# Patient Record
Sex: FEMALE | Race: WHITE | HISPANIC OR LATINO | Employment: UNEMPLOYED | URBAN - METROPOLITAN AREA
[De-identification: names, ages, dates, MRNs, and addresses within clinical notes are randomized per-mention and may not be internally consistent; named-entity substitution may affect disease eponyms.]

---

## 2016-05-03 LAB — EXTERNAL HIV SCREEN: NORMAL

## 2018-03-15 LAB — HCV AB SER-ACNC: NEGATIVE

## 2019-05-29 ENCOUNTER — OFFICE VISIT (OUTPATIENT)
Dept: OBGYN CLINIC | Facility: CLINIC | Age: 59
End: 2019-05-29
Payer: COMMERCIAL

## 2019-05-29 VITALS
WEIGHT: 149.2 LBS | HEIGHT: 63 IN | DIASTOLIC BLOOD PRESSURE: 90 MMHG | SYSTOLIC BLOOD PRESSURE: 140 MMHG | OXYGEN SATURATION: 98 % | BODY MASS INDEX: 26.44 KG/M2 | HEART RATE: 100 BPM

## 2019-05-29 DIAGNOSIS — R10.2 COMBINED ABDOMINAL AND PELVIC PAIN: Primary | ICD-10-CM

## 2019-05-29 DIAGNOSIS — R10.9 COMBINED ABDOMINAL AND PELVIC PAIN: Primary | ICD-10-CM

## 2019-05-29 PROCEDURE — 99204 OFFICE O/P NEW MOD 45 MIN: CPT | Performed by: NURSE PRACTITIONER

## 2019-05-29 RX ORDER — CYCLOSPORINE 0.5 MG/ML
EMULSION OPHTHALMIC EVERY 12 HOURS
COMMUNITY
Start: 2019-02-10 | End: 2021-04-23

## 2019-05-30 ENCOUNTER — TELEPHONE (OUTPATIENT)
Dept: GASTROENTEROLOGY | Facility: AMBULARY SURGERY CENTER | Age: 59
End: 2019-05-30

## 2019-05-31 ENCOUNTER — OFFICE VISIT (OUTPATIENT)
Dept: UROLOGY | Facility: CLINIC | Age: 59
End: 2019-05-31
Payer: COMMERCIAL

## 2019-05-31 VITALS
HEART RATE: 76 BPM | WEIGHT: 151 LBS | BODY MASS INDEX: 26.75 KG/M2 | DIASTOLIC BLOOD PRESSURE: 70 MMHG | HEIGHT: 63 IN | SYSTOLIC BLOOD PRESSURE: 124 MMHG

## 2019-05-31 DIAGNOSIS — Z12.11 SCREENING FOR COLON CANCER: ICD-10-CM

## 2019-05-31 DIAGNOSIS — R10.2 PELVIC PAIN: Primary | ICD-10-CM

## 2019-05-31 LAB
SL AMB  POCT GLUCOSE, UA: ABNORMAL
SL AMB LEUKOCYTE ESTERASE,UA: ABNORMAL
SL AMB POCT BILIRUBIN,UA: ABNORMAL
SL AMB POCT BLOOD,UA: ABNORMAL
SL AMB POCT CLARITY,UA: CLEAR
SL AMB POCT COLOR,UA: YELLOW
SL AMB POCT KETONES,UA: ABNORMAL
SL AMB POCT NITRITE,UA: ABNORMAL
SL AMB POCT PH,UA: 5
SL AMB POCT SPECIFIC GRAVITY,UA: 1.02
SL AMB POCT URINE PROTEIN: ABNORMAL
SL AMB POCT UROBILINOGEN: ABNORMAL

## 2019-05-31 PROCEDURE — 99243 OFF/OP CNSLTJ NEW/EST LOW 30: CPT | Performed by: UROLOGY

## 2019-05-31 PROCEDURE — 81002 URINALYSIS NONAUTO W/O SCOPE: CPT | Performed by: UROLOGY

## 2019-06-06 ENCOUNTER — CONSULT (OUTPATIENT)
Dept: GASTROENTEROLOGY | Facility: MEDICAL CENTER | Age: 59
End: 2019-06-06
Payer: COMMERCIAL

## 2019-06-06 VITALS
HEIGHT: 63 IN | TEMPERATURE: 98.5 F | HEART RATE: 67 BPM | SYSTOLIC BLOOD PRESSURE: 126 MMHG | DIASTOLIC BLOOD PRESSURE: 74 MMHG | BODY MASS INDEX: 26.51 KG/M2 | WEIGHT: 149.6 LBS

## 2019-06-06 DIAGNOSIS — R10.9 ABDOMINAL PAIN, UNSPECIFIED ABDOMINAL LOCATION: Primary | ICD-10-CM

## 2019-06-06 DIAGNOSIS — Z12.11 SCREENING FOR COLON CANCER: ICD-10-CM

## 2019-06-06 DIAGNOSIS — R12 HEARTBURN: ICD-10-CM

## 2019-06-06 DIAGNOSIS — R13.10 DYSPHAGIA, UNSPECIFIED TYPE: ICD-10-CM

## 2019-06-06 PROCEDURE — 99244 OFF/OP CNSLTJ NEW/EST MOD 40: CPT | Performed by: INTERNAL MEDICINE

## 2019-06-10 ENCOUNTER — ANESTHESIA EVENT (OUTPATIENT)
Dept: GASTROENTEROLOGY | Facility: MEDICAL CENTER | Age: 59
End: 2019-06-10

## 2019-06-13 ENCOUNTER — OFFICE VISIT (OUTPATIENT)
Dept: UROLOGY | Facility: MEDICAL CENTER | Age: 59
End: 2019-06-13
Payer: COMMERCIAL

## 2019-06-13 VITALS
WEIGHT: 151 LBS | DIASTOLIC BLOOD PRESSURE: 70 MMHG | SYSTOLIC BLOOD PRESSURE: 120 MMHG | HEART RATE: 68 BPM | HEIGHT: 63 IN | BODY MASS INDEX: 26.75 KG/M2

## 2019-06-13 DIAGNOSIS — R35.0 URINARY FREQUENCY: ICD-10-CM

## 2019-06-13 DIAGNOSIS — N39.41 URGE INCONTINENCE: ICD-10-CM

## 2019-06-13 DIAGNOSIS — G54.1 GENITOFEMORAL NEUROPATHY: ICD-10-CM

## 2019-06-13 DIAGNOSIS — N95.2 ATROPHIC VAGINITIS: ICD-10-CM

## 2019-06-13 DIAGNOSIS — R10.2 PELVIC PAIN: Primary | ICD-10-CM

## 2019-06-13 DIAGNOSIS — M62.89 PELVIC FLOOR DYSFUNCTION: ICD-10-CM

## 2019-06-13 LAB
POST-VOID RESIDUAL VOLUME, ML POC: 6 ML
SL AMB  POCT GLUCOSE, UA: ABNORMAL
SL AMB LEUKOCYTE ESTERASE,UA: ABNORMAL
SL AMB POCT BILIRUBIN,UA: ABNORMAL
SL AMB POCT BLOOD,UA: ABNORMAL
SL AMB POCT CLARITY,UA: CLEAR
SL AMB POCT COLOR,UA: YELLOW
SL AMB POCT KETONES,UA: ABNORMAL
SL AMB POCT NITRITE,UA: ABNORMAL
SL AMB POCT PH,UA: 5.5
SL AMB POCT SPECIFIC GRAVITY,UA: 1
SL AMB POCT URINE PROTEIN: ABNORMAL
SL AMB POCT UROBILINOGEN: 0.2

## 2019-06-13 PROCEDURE — 51798 US URINE CAPACITY MEASURE: CPT | Performed by: UROLOGY

## 2019-06-13 PROCEDURE — 99214 OFFICE O/P EST MOD 30 MIN: CPT | Performed by: UROLOGY

## 2019-06-13 PROCEDURE — 81003 URINALYSIS AUTO W/O SCOPE: CPT | Performed by: UROLOGY

## 2019-06-13 RX ORDER — GABAPENTIN 100 MG/1
100 CAPSULE ORAL 3 TIMES DAILY
Qty: 90 CAPSULE | Refills: 11 | Status: SHIPPED | OUTPATIENT
Start: 2019-06-13 | End: 2019-06-20 | Stop reason: ALTCHOICE

## 2019-06-20 ENCOUNTER — OFFICE VISIT (OUTPATIENT)
Dept: FAMILY MEDICINE CLINIC | Facility: CLINIC | Age: 59
End: 2019-06-20
Payer: COMMERCIAL

## 2019-06-20 VITALS
BODY MASS INDEX: 26.9 KG/M2 | TEMPERATURE: 98.9 F | HEART RATE: 73 BPM | DIASTOLIC BLOOD PRESSURE: 70 MMHG | SYSTOLIC BLOOD PRESSURE: 120 MMHG | WEIGHT: 151.8 LBS | OXYGEN SATURATION: 97 % | HEIGHT: 63 IN

## 2019-06-20 DIAGNOSIS — M79.10 MYALGIA: Primary | Chronic | ICD-10-CM

## 2019-06-20 PROBLEM — R10.30 LOWER ABDOMINAL PAIN: Chronic | Status: ACTIVE | Noted: 2019-06-20

## 2019-06-20 PROCEDURE — 99204 OFFICE O/P NEW MOD 45 MIN: CPT | Performed by: FAMILY MEDICINE

## 2019-06-20 PROCEDURE — 1036F TOBACCO NON-USER: CPT | Performed by: FAMILY MEDICINE

## 2019-06-21 ENCOUNTER — HOSPITAL ENCOUNTER (OUTPATIENT)
Dept: ULTRASOUND IMAGING | Facility: HOSPITAL | Age: 59
Discharge: HOME | End: 2019-06-21
Attending: NURSE PRACTITIONER
Payer: COMMERCIAL

## 2019-06-21 DIAGNOSIS — R10.9 COMBINED ABDOMINAL AND PELVIC PAIN: ICD-10-CM

## 2019-06-21 DIAGNOSIS — R10.2 COMBINED ABDOMINAL AND PELVIC PAIN: ICD-10-CM

## 2019-06-21 PROCEDURE — 76856 US EXAM PELVIC COMPLETE: CPT

## 2019-06-21 PROCEDURE — 76830 TRANSVAGINAL US NON-OB: CPT

## 2019-06-28 LAB
BUN SERPL-MCNC: 14 MG/DL (ref 7–25)
BUN/CREAT SERPL: ABNORMAL (CALC) (ref 6–22)
CALCIUM SERPL-MCNC: 9.6 MG/DL (ref 8.6–10.4)
CHLORIDE SERPL-SCNC: 105 MMOL/L (ref 98–110)
CO2 SERPL-SCNC: 29 MMOL/L (ref 20–32)
CREAT SERPL-MCNC: 0.64 MG/DL (ref 0.5–0.99)
CRP SERPL-MCNC: 2.9 MG/L
ERYTHROCYTE [SEDIMENTATION RATE] IN BLOOD BY WESTERGREN METHOD: 9 MM/H
GLUCOSE SERPL-MCNC: 101 MG/DL (ref 65–99)
POTASSIUM SERPL-SCNC: 4.9 MMOL/L (ref 3.5–5.3)
SL AMB EGFR AFRICAN AMERICAN: 112 ML/MIN/1.73M2
SL AMB EGFR NON AFRICAN AMERICAN: 97 ML/MIN/1.73M2
SODIUM SERPL-SCNC: 140 MMOL/L (ref 135–146)

## 2019-07-02 ENCOUNTER — HOSPITAL ENCOUNTER (OUTPATIENT)
Dept: MRI IMAGING | Facility: HOSPITAL | Age: 59
Discharge: HOME | End: 2019-07-02
Attending: UROLOGY
Payer: COMMERCIAL

## 2019-07-02 DIAGNOSIS — R10.2 PELVIC PAIN: ICD-10-CM

## 2019-07-02 DIAGNOSIS — M62.89 PELVIC FLOOR DYSFUNCTION: ICD-10-CM

## 2019-07-02 PROCEDURE — 72197 MRI PELVIS W/O & W/DYE: CPT

## 2019-07-02 PROCEDURE — A9585 GADOBUTROL INJECTION: HCPCS | Performed by: UROLOGY

## 2019-07-02 RX ADMIN — GADOBUTROL 6 ML: 604.72 INJECTION INTRAVENOUS at 16:15

## 2019-07-08 ENCOUNTER — TELEPHONE (OUTPATIENT)
Dept: UROLOGY | Facility: AMBULATORY SURGERY CENTER | Age: 59
End: 2019-07-08

## 2019-07-08 NOTE — TELEPHONE ENCOUNTER
Okay to notify patient that there was no masses or cystocele noted on the MRI  There is, however, something the radiologist is calling posterior compartment relaxation resolving the rectum  Plan of care will be discussed at her appointment with Dr James Reese

## 2019-07-08 NOTE — TELEPHONE ENCOUNTER
Patient of Dr Trevino Agent calling to inquire about test results for MRI  She has upcoming appointment on 7/16/19 but rather not wait for results  Please advise

## 2019-07-16 ENCOUNTER — OFFICE VISIT (OUTPATIENT)
Dept: UROLOGY | Facility: MEDICAL CENTER | Age: 59
End: 2019-07-16
Payer: COMMERCIAL

## 2019-07-16 VITALS
BODY MASS INDEX: 26.58 KG/M2 | WEIGHT: 150 LBS | SYSTOLIC BLOOD PRESSURE: 110 MMHG | HEIGHT: 63 IN | HEART RATE: 61 BPM | DIASTOLIC BLOOD PRESSURE: 80 MMHG

## 2019-07-16 DIAGNOSIS — F41.8 ANTICIPATORY ANXIETY: Primary | ICD-10-CM

## 2019-07-16 DIAGNOSIS — N39.41 URGE INCONTINENCE: ICD-10-CM

## 2019-07-16 PROCEDURE — 99213 OFFICE O/P EST LOW 20 MIN: CPT | Performed by: UROLOGY

## 2019-07-16 RX ORDER — DIAZEPAM 10 MG/1
10 TABLET ORAL ONCE
Qty: 1 TABLET | Refills: 0 | Status: SHIPPED | OUTPATIENT
Start: 2019-07-16 | End: 2019-08-27 | Stop reason: ALTCHOICE

## 2019-07-16 NOTE — PROGRESS NOTES
[address] [facility]  [facility]  [address] [facility]  [place], [address] [place] [address]  [phone]  www  [email or web address]  [email or web address]      NAME: Eduarda Owen  AGE: 59 y o  SEX: female  : 1960   MRN: [id number]    DATE: 2019  TIME: 12:05 PM    Assessment and Plan:    Lower abdominal pain and pelvic pain, with trigger point in the right lateral aspect of her Pfannenstiel incision  She also has urge incontinence and probably some bladder spasms  I encouraged her to use the Myrbetriq  She is being worked up for fibromyalgia by Dr Gan Lax   She does not wish to have a bupivacaine injection at this time  MRI is negative  There is nothing life-threatening occurring so all treatments are based upon her symptoms  Follow-up with me as needed  Chief Complaint   No chief complaint on file  History of Present Illness   Here for follow-up of pelvic pain with a trigger 0 1 cm above right side of her Pfannenstiel incision, and left incisional pain  She also has left posterior pelvic vaginal wall tenderness that triggers the symptoms  Very tight pelvic floor muscles  She was referred to physical therapy last office visit, and started on Myrbetriq for urge incontinence  A pelvic MRI was ordered and this showed a rectocele and sigmoid diverticulosis but otherwise was normal   Last office visit we had considered giving trigger point injections with bupivacaine  She is very fearful of receiving an injection  When she took the 1 tablet Myrbetriq she said it actually helped with her pain  Therefore encouraged her to use the Myrbetriq because she will not have any central side effects and she is concerned about affecting her liver, but I reassured her that I give this to many people with liver dysfunction without any problem  She also drinks a lot of water so she goes frequently        The following portions of the patient's history were reviewed and updated as appropriate: allergies, current medications, past family history, past medical history, past social history, past surgical history and problem list   Past Medical History:   Diagnosis Date    Abnormal Pap smear of cervix     Heart palpitations     has port to monitor; none since 2009    Hyperlipemia     MVP (mitral valve prolapse) 1985     Past Surgical History:   Procedure Laterality Date    CARDIAC CATHETERIZATION  2009    normal    CARDIAC LOOP [address]    with LSO; ? 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 -- patient unclear with history     shoulder  Review of Systems   Review of Systems    Active Problem List     Patient Active Problem List   Diagnosis    Myalgia    Lower abdominal pain       Objective   There were no vitals taken for this visit      Physical Exam        Current Medications     Current Outpatient Medications:     cycloSPORINE (RESTASIS MULTIDOSE) 0 05 % ophthalmic emulsion, , Disp: , Rfl:     Na Sulfate-K Sulfate-Mg Sulf (SUPREP BOWEL PREP KIT) 17 5-3 13-1 6 GM/177ML SOLN, Take 1 Dose by mouth once for 1 dose, Disp: 2 Bottle, Rfl: 0    Travoprost (TRAVATAN Z OP), Apply to eye, Disp: , Rfl:         Timothy Simon MD

## 2019-07-26 ENCOUNTER — HOSPITAL ENCOUNTER (OUTPATIENT)
Dept: GASTROENTEROLOGY | Facility: MEDICAL CENTER | Age: 59
Setting detail: OUTPATIENT SURGERY
Discharge: HOME | End: 2019-07-26
Attending: INTERNAL MEDICINE | Admitting: INTERNAL MEDICINE
Payer: COMMERCIAL

## 2019-07-26 ENCOUNTER — ANESTHESIA (OUTPATIENT)
Dept: GASTROENTEROLOGY | Facility: MEDICAL CENTER | Age: 59
End: 2019-07-26

## 2019-07-26 VITALS
HEART RATE: 55 BPM | BODY MASS INDEX: 26.58 KG/M2 | OXYGEN SATURATION: 99 % | TEMPERATURE: 98.3 F | HEIGHT: 63 IN | DIASTOLIC BLOOD PRESSURE: 64 MMHG | SYSTOLIC BLOOD PRESSURE: 104 MMHG | RESPIRATION RATE: 16 BRPM | WEIGHT: 150 LBS

## 2019-07-26 DIAGNOSIS — R10.9 ABDOMINAL PAIN, UNSPECIFIED ABDOMINAL LOCATION: ICD-10-CM

## 2019-07-26 PROCEDURE — 43239 EGD BIOPSY SINGLE/MULTIPLE: CPT | Performed by: INTERNAL MEDICINE

## 2019-07-26 PROCEDURE — 45380 COLONOSCOPY AND BIOPSY: CPT | Performed by: INTERNAL MEDICINE

## 2019-07-26 PROCEDURE — 88305 TISSUE EXAM BY PATHOLOGIST: CPT | Performed by: PATHOLOGY

## 2019-07-26 RX ORDER — SODIUM CHLORIDE 9 MG/ML
125 INJECTION, SOLUTION INTRAVENOUS CONTINUOUS
Status: DISCONTINUED | OUTPATIENT
Start: 2019-07-26 | End: 2019-07-30 | Stop reason: HOSPADM

## 2019-07-26 RX ORDER — PROPOFOL 10 MG/ML
INJECTION, EMULSION INTRAVENOUS AS NEEDED
Status: DISCONTINUED | OUTPATIENT
Start: 2019-07-26 | End: 2019-07-26 | Stop reason: SURG

## 2019-07-26 RX ADMIN — PROPOFOL 200 MG: 10 INJECTION, EMULSION INTRAVENOUS at 11:26

## 2019-07-26 RX ADMIN — Medication 40 MG: at 11:39

## 2019-07-26 RX ADMIN — PROPOFOL 100 MG: 10 INJECTION, EMULSION INTRAVENOUS at 11:28

## 2019-07-26 RX ADMIN — PROPOFOL 100 MG: 10 INJECTION, EMULSION INTRAVENOUS at 11:38

## 2019-07-26 RX ADMIN — PROPOFOL 100 MG: 10 INJECTION, EMULSION INTRAVENOUS at 11:42

## 2019-07-26 RX ADMIN — SODIUM CHLORIDE 125 ML/HR: 0.9 INJECTION, SOLUTION INTRAVENOUS at 10:50

## 2019-07-26 NOTE — H&P
History and Physical -  [facility]  Stephen Corral 59 y o  female MRN: [id number]                  HPI: Stephen Corral is a 59y o  year old female who presents for heartburn, abdominal pain  REVIEW OF SYSTEMS: Per the HPI, and otherwise unremarkable      Historical Information   Past Medical History:   Diagnosis Date    Abnormal Pap smear of cervix     Colon polyp     Glaucoma     Heart palpitations     has port to monitor; none since 2009    Hyperlipemia     MVP (mitral valve prolapse) 1985     Past Surgical History:   Procedure Laterality Date    BREAST IMPLANT Bilateral     CARDIAC CATHETERIZATION  2009    normal    CARDIAC LOOP RECORDER      CHOLECYSTECTOMY  1995    COLONOSCOPY      HERNIA REPAIR      umbilical    KNEE DISLOCATION SURGERY Right     TOTAL ABDOMINAL HYSTERECTOMY  1999    with LSO; ? 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 -- patient unclear with history    TUBAL LIGATION      WRIST SURGERY Left     states there are screws but not metal   Can go through a metal detector     Social History   Social History     Substance and Sexual Activity   Alcohol Use Never    Frequency: Never     Social History     Substance and Sexual Activity   Drug Use Never     Social History     Tobacco Use   Smoking Status Never Smoker   Smokeless Tobacco Never Used     Family History   Problem Relation Age of Onset    Hypertension Mother     Breast cancer Mother     Osteoporosis Mother     Heart attack Father     Diabetes Sister     Diabetes Brother     Heart attack Paternal Grandfather        Meds/Allergies       (Not in a hospital admission)    Allergies   Allergen Reactions    Brimonidine Tartrate     Iodine Hives and Swelling     12/15/10      Nuts Itching     Any kind of nuts    Shellfish Allergy     Shellfish-Derived Products Hives and Swelling    Brimonidine Itching and Palpitations       Objective     Blood pressure 127/68, pulse 62, temperature 98 3 °F (36 8 °C), temperature source Temporal, resp  rate 16, height 5' 3" (1 6 m), weight 68 kg (150 lb), SpO2 99 %  PHYSICAL EXAM    Gen: NAD  CV: RRR  CHEST: Clear  ABD: soft, NT/ND  EXT: no edema      ASSESSMENT/PLAN:  This is a 59y o  year old female here for upper endoscopy and colonoscopy, and she is stable and optimized for her procedure

## 2019-07-26 NOTE — DISCHARGE INSTRUCTIONS
Upper Endoscopy   WHAT YOU NEED TO KNOW:   An upper endoscopy is also called an upper gastrointestinal (GI) endoscopy, or an esophagogastroduodenoscopy (EGD)  You may feel bloated, gassy, or have some abdominal discomfort after your procedure  Your throat may be sore for 24 to 36 hours  You may burp or pass gas from air that is still inside your body  DISCHARGE INSTRUCTIONS:   Call 911 if:   · You have sudden chest pain or trouble breathing  Seek care immediately if:   · You feel dizzy or faint  · You have trouble swallowing  · You have severe throat pain  · Your bowel movements are very dark or black  · Your abdomen is hard and firm and you have severe pain  · You vomit blood  Contact your healthcare provider if:   · You feel full or bloated and cannot burp or pass gas  · You have not had a bowel movement for 3 days after your procedure  · You have neck pain  · You have a fever or chills  · You have nausea or are vomiting  · You have a rash or hives  · You have questions or concerns about your endoscopy  Relieve a sore throat:  Suck on throat lozenges or crushed ice  Gargle with a small amount of warm salt water  Mix 1 teaspoon of salt and 1 cup of warm water to make salt water  Relieve gas and discomfort from bloating:  Lie on your right side with a heating pad on your abdomen  Take short walks to help pass gas  Eat small meals until bloating is relieved  Rest after your procedure:  Do not drive or make important decisions until the day after your procedure  Return to your normal activity as directed  You can usually return to work the day after your procedure  Follow up with your healthcare provider as directed:  Write down your questions so you remember to ask them during your visits  © 2017 [employer] Information is for End User's use only and may not be sold, redistributed or otherwise used for commercial purposes   All illustrations and images included in CareNotes® are the copyrighted property of A D A M , Inc  or Alejandro Lui  The above information is an  only  It is not intended as medical advice for individual conditions or treatments  Talk to your doctor, nurse or pharmacist before following any medical regimen to see if it is safe and effective for you  Diverticulosis   WHAT YOU NEED TO KNOW:   Diverticulosis is a condition that causes small pockets called diverticula to form in your intestine  These pockets make it difficult for bowel movements to pass through your digestive system  DISCHARGE INSTRUCTIONS:   Return to the emergency department if:   · You have severe pain on the left side of your lower abdomen  · Your bowel movements are bright or dark red  Contact your healthcare provider if:   · You have a fever and chills  · You feel dizzy or lightheaded  · You have nausea, or you are vomiting  · You have a change in your bowel movements  · You have questions or concerns about your condition or care  Medicines:   · Medicines  to soften your bowel movements may be given  You may also need medicines to treat symptoms such as bloating and pain  · Take your medicine as directed  Contact your healthcare provider if you think your medicine is not helping or if you have side effects  Tell him or her if you are allergic to any medicine  Keep a list of the medicines, vitamins, and herbs you take  Include the amounts, and when and why you take them  Bring the list or the pill bottles to follow-up visits  Carry your medicine list with you in case of an emergency  Self-care: The goal of treatment is to manage any symptoms you have and prevent other problems such as diverticulitis  Diverticulitis is swelling or infection of the diverticula  Your healthcare provider may recommend any of the following:  · Eat a variety of high-fiber foods    High-fiber foods help you have regular bowel movements  High-fiber foods include cooked beans, fruits, vegetables, and some cereals  Most adults need 25 to 35 grams of fiber each day  Your healthcare provider may recommend that you have more  Ask your healthcare provider how much fiber you need  Increase fiber slowly  You may have abdominal discomfort, bloating, and gas if you add fiber to your diet too quickly  You may need to take a fiber supplement if you are not getting enough fiber from food  · Drink liquids as directed  You may need to drink 2 to 3 liters (8 to 12 cups) of liquids every day  Ask your healthcare provider how much liquid to drink each day and which liquids are best for you  · Apply heat  on your abdomen for 20 to 30 minutes every 2 hours for as many days as directed  Heat helps decrease pain and muscle spasms  Help prevent diverticulitis or other symptoms: The following may help decrease your risk for diverticulitis or symptoms, such as bleeding  Talk to your provider about these or other things you can do to prevent problems that may occur with diverticulosis  · Exercise regularly  Ask your healthcare provider about the best exercise plan for you  Exercise can help you have regular bowel movements  Get 30 minutes of exercise on most days of the week  · Maintain a healthy weight  Ask your healthcare provider how much you should weigh  Ask him or her to help you create a weight loss plan if you are overweight  · Do not smoke  Nicotine and other chemicals in cigarettes increase your risk for diverticulitis  Ask your healthcare provider for information if you currently smoke and need help to quit  E-cigarettes or smokeless tobacco still contain nicotine  Talk to your healthcare provider before you use these products  · Ask your healthcare provider if it is safe to take NSAIDs  NSAIDs may increase your risk of diverticulitis    Follow up with your healthcare provider as directed:  Write down your questions so you remember to ask them during your visits  © 2017 [employer] Information is for End User's use only and may not be sold, redistributed or otherwise used for commercial purposes  All illustrations and images included in CareNotes® are the copyrighted property of A D A M , Inc  or Alejandro Lui  The above information is an  only  It is not intended as medical advice for individual conditions or treatments  Talk to your doctor, nurse or pharmacist before following any medical regimen to see if it is safe and effective for you  Diverticulosis Diet   WHAT YOU NEED TO KNOW:   What is a diverticulosis diet? A diverticulosis diet includes high-fiber foods  High-fiber foods help you have regular bowel movements  Extra fiber may decrease your risk of forming new diverticula (small pockets) in your intestine  A high-fiber diet may also help prevent diverticulitis  Diverticulitis is a painful condition that occurs when diverticula become inflamed or infected  You do not need to avoid nuts, seeds, corn, or popcorn while you are on a diverticulosis diet  How much fiber do I need? You may need 25 to 35 grams of fiber each day  Ask your dietitian or healthcare provider how much fiber you should have  Increase your intake of fiber slowly  When you eat more fiber, you may have gas and feel bloated  You may need to take a fiber supplement if you do not get enough fiber from food  Drink plenty of liquids as you increase the fiber in your diet  Your dietitian or healthcare provider may recommend 8 eight-ounce cups or more each day  Ask which liquids are best for you  Which foods are high in fiber?    · Foods with at least 4 grams of fiber per serving:      ¨ ? to ½ cup of high-fiber cereal (check the nutrition label on the box)    ¨ ½ cup of blackberries or raspberries    ¨ 4 dried prunes    ¨ 1 cooked artichoke    ¨ ½ cup of cooked legumes, such as lentils, or red, kidney, and rangel beans    · Foods with 1 to 3 grams of fiber per serving:      ¨ 1 slice of whole-wheat, pumpernickel, or rye bread    ¨ 4 whole-wheat crackers    ¨ ½ cup of cereal with 1 to 3 grams of fiber per serving (check the nutrition label on the box)    ¨ 1 piece of fruit, such as an apple, banana, pear, kiwi, or orange    ¨ 3 dates    ¨ ½ cup of canned apricots, fruit cocktail, peaches, or pears    ¨ ½ cup of raw or cooked vegetables, such as carrots, cauliflower, cabbage, spinach, squash, or corn  When should I contact my healthcare provider? · You have questions about a high-fiber diet  · You have a change in your bowel movements  · You have an upset stomach  · You have a fever  · You have pain in your lower abdomen on the left side  · You have questions about your condition or care  CARE AGREEMENT:   You have the right to help plan your care  Learn about your health condition and how it may be treated  Discuss treatment options with your caregivers to decide what care you want to receive  You always have the right to refuse treatment  The above information is an  only  It is not intended as medical advice for individual conditions or treatments  Talk to your doctor, nurse or pharmacist before following any medical regimen to see if it is safe and effective for you  © 2017 [employer] Information is for End User's use only and may not be sold, redistributed or otherwise used for commercial purposes  All illustrations and images included in CareNotes® are the copyrighted property of A D A [employer]  or Alejandro Lui  Colonoscopy   WHAT YOU NEED TO KNOW:   A colonoscopy is a procedure to examine the inside of your colon (intestine) with a scope  Polyps or tissue growths may have been removed during your colonoscopy  It is normal to feel bloated and to have some abdominal discomfort  You should be passing gas   If you have hemorrhoids or you had polyps removed, you may have a small amount of bleeding  DISCHARGE INSTRUCTIONS:   Seek care immediately if:   · You have a large amount of bright red blood in your bowel movements  · Your abdomen is hard and firm and you have severe pain  · You have sudden trouble breathing  Contact your healthcare provider if:   · You develop a rash or hives  · You have a fever within 24 hours of your procedure       · You have not had a bowel movement for 3 days after your procedure  · You have questions or concerns about your condition or care  Activity:   · Do not lift, strain, or run  for 3 days after your procedure  · Rest after your procedure  You have been given medicine to relax you  Do not  drive or make important decisions until the day after your procedure  Return to your normal activity as directed  · Relieve gas and discomfort from bloating  by lying on your right side with a heating pad on your abdomen  You may need to take short walks to help the gas move out  Eat small meals until bloating is relieved  If you had polyps removed: For 7 days after your procedure:  · Do not  take aspirin  · Do not  go on long car rides  Follow up with your healthcare provider as directed:  Write down your questions so you remember to ask them during your visits  © 2017 [address] [employer] is for End User's use only and may not be sold, redistributed or otherwise used for commercial purposes  All illustrations and images included in CareNotes® are the copyrighted property of A D A [employer]  or Alejandro Lui  The above information is an  only  It is not intended as medical advice for individual conditions or treatments  Talk to your doctor, nurse or pharmacist before following any medical regimen to see if it is safe and effective for you

## 2019-07-26 NOTE — ANESTHESIA PREPROCEDURE EVALUATION
Review of Systems/Medical History  Patient summary reviewed  Chart reviewed      Cardiovascular  Exercise tolerance (METS): >4,  Hyperlipidemia,    Pulmonary  Negative pulmonary ROS        GI/Hepatic  Negative GI/hepatic ROS          Negative  ROS        Endo/Other  Negative endo/other ROS      GYN  Negative gynecology ROS          Hematology  Negative hematology ROS      Musculoskeletal  Negative musculoskeletal ROS        Neurology  Negative neurology ROS      Psychology   Negative psychology ROS              Physical Exam    Airway    Mallampati score: I  TM Distance: <3 FB  Neck ROM: full     Dental       Cardiovascular  Rhythm: regular,     Pulmonary  Breath sounds clear to auscultation,     Other Findings        Anesthesia Plan  ASA Score- 2     Anesthesia Type- IV sedation with anesthesia with ASA Monitors  Additional Monitors:   Airway Plan:         Plan Factors- Patient instructed to abstain from smoking on day of procedure  Patient did not smoke on day of surgery  Induction- intravenous  Postoperative Plan-     Informed Consent- Anesthetic plan and risks discussed with patient

## 2019-07-30 DIAGNOSIS — K27.9 H PYLORI ULCER: Primary | ICD-10-CM

## 2019-07-30 DIAGNOSIS — B96.81 H PYLORI ULCER: Primary | ICD-10-CM

## 2019-07-30 RX ORDER — CLARITHROMYCIN 500 MG/1
500 TABLET, COATED ORAL EVERY 12 HOURS SCHEDULED
Qty: 28 TABLET | Refills: 0 | Status: SHIPPED | OUTPATIENT
Start: 2019-07-30 | End: 2019-08-13

## 2019-07-30 RX ORDER — OMEPRAZOLE 40 MG/1
40 CAPSULE, DELAYED RELEASE ORAL
Qty: 28 CAPSULE | Refills: 0 | Status: SHIPPED | OUTPATIENT
Start: 2019-07-30 | End: 2019-08-27 | Stop reason: ALTCHOICE

## 2019-07-30 RX ORDER — AMOXICILLIN 500 MG/1
1000 TABLET, FILM COATED ORAL 2 TIMES DAILY
Qty: 56 TABLET | Refills: 0 | Status: SHIPPED | OUTPATIENT
Start: 2019-07-30 | End: 2019-08-13

## 2019-08-13 ENCOUNTER — OFFICE VISIT (OUTPATIENT)
Dept: FAMILY MEDICINE CLINIC | Facility: CLINIC | Age: 59
End: 2019-08-13
Payer: COMMERCIAL

## 2019-08-13 VITALS
BODY MASS INDEX: 26.44 KG/M2 | HEART RATE: 71 BPM | DIASTOLIC BLOOD PRESSURE: 70 MMHG | OXYGEN SATURATION: 98 % | SYSTOLIC BLOOD PRESSURE: 90 MMHG | WEIGHT: 149.2 LBS | HEIGHT: 63 IN | TEMPERATURE: 97.6 F

## 2019-08-13 DIAGNOSIS — A04.8 H. PYLORI INFECTION: Chronic | ICD-10-CM

## 2019-08-13 DIAGNOSIS — M79.10 MYALGIA: Chronic | ICD-10-CM

## 2019-08-13 DIAGNOSIS — Z12.31 ENCOUNTER FOR SCREENING MAMMOGRAM FOR MALIGNANT NEOPLASM OF BREAST: Primary | ICD-10-CM

## 2019-08-13 DIAGNOSIS — M81.0 AGE-RELATED OSTEOPOROSIS WITHOUT CURRENT PATHOLOGICAL FRACTURE: Chronic | ICD-10-CM

## 2019-08-13 PROCEDURE — 99214 OFFICE O/P EST MOD 30 MIN: CPT | Performed by: FAMILY MEDICINE

## 2019-08-13 RX ORDER — LATANOPROST 50 UG/ML
SOLUTION/ DROPS OPHTHALMIC
COMMUNITY
Start: 2019-07-22 | End: 2019-12-27 | Stop reason: ALTCHOICE

## 2019-08-13 NOTE — PATIENT INSTRUCTIONS
Use 2 tylenol arthritis for the hip or knee pains ; Return in 3 wks for review of xrays ; consider injection lateral L hip region (bursitits )     Begin 1 Caltrate D daily for 1 month  Then start taking it twice a day  To prevent constipation drink plenty of fluids take for dried fruits fresh veggies that have a lot of crunch to them  You may need to institute a daily stool softener if the calcium is too constipating      We will probably consider a referral for rheumatologic evaluation on the osteoporosis as anti resorptive medications orally would be a bad idea given her recent stomach issues

## 2019-08-13 NOTE — PROGRESS NOTES
Assessment/Plan:    No problem-specific Assessment & Plan notes found for this encounter  Diagnoses and all orders for this visit:    Encounter for screening mammogram for malignant neoplasm of breast    Myalgia    Other orders  -     Cancel: Mammo screening bilateral w 3d & cad; Future  -     latanoprost (XALATAN) 0 005 % ophthalmic solution          Subjective:      Patient ID: Jose Maria Joseph is a 59 y o  female  Patient here to follow up the above studies  She did some some research on fibromyalgia and does not feel that that actually applies  Most of her pain is in the left hip region and right knee  We do not have any fresh x-rays on those areas for the last 4 years  DEXA bone scan was reviewed and shows osteoporosis  She has recently diagnosed with H pylori gastritis and has begun triple therapy yesterday  Sed rate and CRP were unrevealing  Lidocaine patch to R knee helps           The following portions of the patient's history were reviewed and updated as appropriate: allergies, current medications, past family history, past medical history, past social history, past surgical history and problem list     Review of Systems   Musculoskeletal: Positive for arthralgias, gait problem and myalgias  Objective:  Vitals:    08/13/19 1533   BP: 90/70   BP Location: Left arm   Patient Position: Sitting   Cuff Size: Adult   Pulse: 71   Temp: 97 6 °F (36 4 °C)   TempSrc: Temporal   SpO2: 98%   Weight: 67 7 kg (149 lb 3 2 oz)   Height: 5' 3" (1 6 m)      Physical Exam   Musculoskeletal: Normal range of motion     Tender over lateral L thigh [de-identified]   ??   Bursitis

## 2019-08-14 ENCOUNTER — TELEPHONE (OUTPATIENT)
Dept: GASTROENTEROLOGY | Facility: AMBULARY SURGERY CENTER | Age: 59
End: 2019-08-14

## 2019-08-14 NOTE — TELEPHONE ENCOUNTER
Rashel      She has questions about the doses of amoxicillin and omeprazole--please advise      Call back #  [phone] until pm

## 2019-08-14 NOTE — TELEPHONE ENCOUNTER
I called her work number but she was not at her desk, I then called the main number listed and left a message for her to call back

## 2019-08-17 LAB — 25(OH)D3 SERPL-MCNC: 13 NG/ML (ref 30–100)

## 2019-08-21 ENCOUNTER — TREATMENT (OUTPATIENT)
Dept: FAMILY MEDICINE CLINIC | Facility: CLINIC | Age: 59
End: 2019-08-21

## 2019-08-21 ENCOUNTER — TELEPHONE (OUTPATIENT)
Dept: FAMILY MEDICINE CLINIC | Facility: CLINIC | Age: 59
End: 2019-08-21

## 2019-08-21 DIAGNOSIS — M81.0 AGE-RELATED OSTEOPOROSIS WITHOUT CURRENT PATHOLOGICAL FRACTURE: Primary | Chronic | ICD-10-CM

## 2019-08-21 RX ORDER — ERGOCALCIFEROL 1.25 MG/1
50000 CAPSULE ORAL WEEKLY
Qty: 12 CAPSULE | Refills: 0 | Status: SHIPPED | OUTPATIENT
Start: 2019-08-21 | End: 2019-11-21 | Stop reason: ALTCHOICE

## 2019-08-21 NOTE — TELEPHONE ENCOUNTER
Spoke with patient she is aware of the message below can you please send over the rx  For her vitamin d and please mail out a new lab rx along with her recent results   Thank you

## 2019-08-21 NOTE — TELEPHONE ENCOUNTER
Left message for patient to call back regarding blood work results   Per glp results are; Vitamin-D quite low   We need to correct this so her osteoporosis is not get worse   It is possible the vitamin-D level may also explain some of her symptoms but there is no way to know until we get her vitamin-D back to normal      I will send in 8 weeks of weekly replacement 55257 units weekly after that she should begin 2000 units daily we should then repeat the vitamin-D level after on the daily dose for 4-6 weeks

## 2019-08-27 ENCOUNTER — OFFICE VISIT (OUTPATIENT)
Dept: FAMILY MEDICINE CLINIC | Facility: CLINIC | Age: 59
End: 2019-08-27
Payer: COMMERCIAL

## 2019-08-27 VITALS
TEMPERATURE: 97 F | SYSTOLIC BLOOD PRESSURE: 100 MMHG | HEIGHT: 63 IN | OXYGEN SATURATION: 98 % | DIASTOLIC BLOOD PRESSURE: 70 MMHG | HEART RATE: 62 BPM | WEIGHT: 150.6 LBS | BODY MASS INDEX: 26.68 KG/M2

## 2019-08-27 DIAGNOSIS — M81.0 AGE-RELATED OSTEOPOROSIS WITHOUT CURRENT PATHOLOGICAL FRACTURE: Primary | Chronic | ICD-10-CM

## 2019-08-27 DIAGNOSIS — A04.8 H. PYLORI INFECTION: Chronic | ICD-10-CM

## 2019-08-27 PROCEDURE — 99402 PREV MED CNSL INDIV APPRX 30: CPT | Performed by: FAMILY MEDICINE

## 2019-08-27 NOTE — PATIENT INSTRUCTIONS
You should try to get 10,000 steps 3 or 4 days per week  Continue the high-dose vitamin-D  We will set up an appointment with the Rheumatology doctors to talk about osteoporosis treatment

## 2019-08-27 NOTE — PROGRESS NOTES
Assessment/Plan:    No problem-specific Assessment & Plan notes found for this encounter  Diagnoses and all orders for this visit:    Age-related osteoporosis without current pathological fracture          Subjective:      Patient ID: Daniel Lagunas is a 59 y o  female  F/U osteoporosis and h pylori ; The following portions of the patient's history were reviewed and updated as appropriate: allergies, current medications, past family history, past medical history, past social history, past surgical history and problem list     Review of Systems      Objective:  Vitals:    08/27/19 1608   BP: 100/70   BP Location: Left arm   Patient Position: Sitting   Cuff Size: Adult   Pulse: 62   Temp: (!) 97 °F (36 1 °C)   TempSrc: Temporal   SpO2: 98%   Weight: 68 3 kg (150 lb 9 6 oz)   Height: 5' 3" (1 6 m)      Physical Exam   Constitutional: She appears well-developed and well-nourished  Eyes: Conjunctivae are normal    Pulmonary/Chest: Effort normal    Skin: Skin is warm and dry  Psychiatric: Thought content normal          More than half of this 30   minute visit spent counseling and coordinating care  We will ck on "proof of cure " requirements for h pylori

## 2019-08-28 ENCOUNTER — TELEPHONE (OUTPATIENT)
Dept: FAMILY MEDICINE CLINIC | Facility: CLINIC | Age: 59
End: 2019-08-28

## 2019-08-28 ENCOUNTER — TREATMENT (OUTPATIENT)
Dept: FAMILY MEDICINE CLINIC | Facility: CLINIC | Age: 59
End: 2019-08-28

## 2019-08-28 DIAGNOSIS — A04.8 H. PYLORI INFECTION: Primary | Chronic | ICD-10-CM

## 2019-08-28 NOTE — TELEPHONE ENCOUNTER
----- Message from Manuel Smith MD sent at 8/27/2019  4:29 PM EDT -----  Is test of cure required for h pylori

## 2019-08-28 NOTE — TELEPHONE ENCOUNTER
Pt called back returning Anastasia's phone call  She states that she is unavailable from October 3rd thru October 28th  The test can be scheduled for the 1st or the 2nd if Dr Miguel like or when she comes back   she will be available on the 28th of October  Pt can be reached during the day at work at [phone] please advise thank you

## 2019-08-29 NOTE — TELEPHONE ENCOUNTER
Left message for patient to call back  Did try work number but patient was working at her [employer]  Was transferred but was sent to another employees voicemail  Okay to mail out script ? glp is approving her to have done upon her return  St is to do this test at [facility]

## 2019-09-24 ENCOUNTER — TELEPHONE (OUTPATIENT)
Dept: FAMILY MEDICINE CLINIC | Facility: CLINIC | Age: 59
End: 2019-09-24

## 2019-09-24 NOTE — TELEPHONE ENCOUNTER
Still having pelvic pain  Wants appointment this  thursday a m     Leaving the state for one month starting 10/3/19  100% booked for Thursday  Please advise [phone] before 4   Then after 4   Cell [phone]

## 2019-09-26 ENCOUNTER — OFFICE VISIT (OUTPATIENT)
Dept: FAMILY MEDICINE CLINIC | Facility: CLINIC | Age: 59
End: 2019-09-26
Payer: COMMERCIAL

## 2019-09-26 VITALS
SYSTOLIC BLOOD PRESSURE: 100 MMHG | TEMPERATURE: 98.4 F | HEART RATE: 68 BPM | DIASTOLIC BLOOD PRESSURE: 80 MMHG | HEIGHT: 63 IN | WEIGHT: 150.1 LBS | OXYGEN SATURATION: 99 % | BODY MASS INDEX: 26.59 KG/M2

## 2019-09-26 DIAGNOSIS — Z11.59 SCREENING FOR VIRAL DISEASE: Primary | ICD-10-CM

## 2019-09-26 DIAGNOSIS — M89.9 PUBIC BONE PAIN: Chronic | ICD-10-CM

## 2019-09-26 PROCEDURE — 3008F BODY MASS INDEX DOCD: CPT | Performed by: FAMILY MEDICINE

## 2019-09-26 PROCEDURE — 99213 OFFICE O/P EST LOW 20 MIN: CPT | Performed by: FAMILY MEDICINE

## 2019-09-26 NOTE — PROGRESS NOTES
Assessment/Plan:    No problem-specific Assessment & Plan notes found for this encounter  Diagnoses and all orders for this visit:    Screening for viral disease  -     Hepatitis C antibody; Future          Subjective:      Patient ID: Haile Bautista is a 59 y o  female  Lower abd/ pelvic pain ; no urine /gyn sx ; no bowels sx x; colon exam UTD / U/S of area OK ; burning discomfort in pelvic area X 1-2 days ; The following portions of the patient's history were reviewed and updated as appropriate: allergies, current medications, past family history, past medical history, past social history, past surgical history and problem list     Review of Systems      Objective:  Vitals:    09/26/19 0856   BP: 100/80   BP Location: Left arm   Patient Position: Sitting   Cuff Size: Adult   Pulse: 68   Temp: 98 4 °F (36 9 °C)   TempSrc: Temporal   SpO2: 99%   Weight: 68 1 kg (150 lb 1 6 oz)   Height: 5' 3" (1 6 m)      Physical Exam   Abdominal: Soft  Bowel sounds are normal  She exhibits no mass  There is tenderness  Tender (quite ) over symphysis pubis          Patient's major area of pain is over the symphysis pubis  I believe there is some inflammation thereof  We described various techniques that she could use when having intimacy  As well she could use Aleve prior to this and possibly even on very bad days

## 2019-11-08 ENCOUNTER — TELEPHONE (OUTPATIENT)
Dept: FAMILY MEDICINE CLINIC | Facility: CLINIC | Age: 59
End: 2019-11-08

## 2019-11-12 ENCOUNTER — APPOINTMENT (OUTPATIENT)
Dept: LAB | Facility: HOSPITAL | Age: 59
End: 2019-11-12
Payer: COMMERCIAL

## 2019-11-12 DIAGNOSIS — M81.0 AGE-RELATED OSTEOPOROSIS WITHOUT CURRENT PATHOLOGICAL FRACTURE: Chronic | ICD-10-CM

## 2019-11-12 LAB — 25(OH)D3 SERPL-MCNC: 53.6 NG/ML (ref 30–100)

## 2019-11-12 PROCEDURE — 82306 VITAMIN D 25 HYDROXY: CPT

## 2019-11-12 PROCEDURE — 36415 COLL VENOUS BLD VENIPUNCTURE: CPT

## 2019-11-21 ENCOUNTER — OFFICE VISIT (OUTPATIENT)
Dept: FAMILY MEDICINE CLINIC | Facility: CLINIC | Age: 59
End: 2019-11-21
Payer: COMMERCIAL

## 2019-11-21 VITALS
HEART RATE: 64 BPM | HEIGHT: 63 IN | OXYGEN SATURATION: 96 % | DIASTOLIC BLOOD PRESSURE: 60 MMHG | BODY MASS INDEX: 27.55 KG/M2 | SYSTOLIC BLOOD PRESSURE: 80 MMHG | TEMPERATURE: 98.5 F | WEIGHT: 155.5 LBS

## 2019-11-21 DIAGNOSIS — Z12.31 ENCOUNTER FOR SCREENING MAMMOGRAM FOR MALIGNANT NEOPLASM OF BREAST: ICD-10-CM

## 2019-11-21 DIAGNOSIS — Z23 IMMUNIZATION DUE: Primary | ICD-10-CM

## 2019-11-21 DIAGNOSIS — A04.8 H. PYLORI INFECTION: Chronic | ICD-10-CM

## 2019-11-21 DIAGNOSIS — M81.0 AGE-RELATED OSTEOPOROSIS WITHOUT CURRENT PATHOLOGICAL FRACTURE: Chronic | ICD-10-CM

## 2019-11-21 PROCEDURE — 99401 PREV MED CNSL INDIV APPRX 15: CPT | Performed by: FAMILY MEDICINE

## 2019-11-21 NOTE — PROGRESS NOTES
Assessment/Plan:    No problem-specific Assessment & Plan notes found for this encounter  Diagnoses and all orders for this visit:    Immunization due    Encounter for screening mammogram for malignant neoplasm of breast  -     Mammo screening bilateral w 3d & cad; Future    Other orders  -     Cancel: influenza vaccine, [id number], quadrivalent, recombinant, PF, 0 5 mL, for patients 18 yr+ (FLUBLOK)          Subjective:      Patient ID: Pedrito Posada is a 59 y o  female  PATIENT RETURNS FOR FOLLOW-UP OF CHRONIC MEDICAL CONDITIONS  NO HOSPITAL STAYS OR EMERGENCY VISITS RECENTLY  MEDS WERE REVIEWED AND NO SIDE EFFECTS  NO NEW ISSUES  UNLESS NOTED BELOW  NO NEW MEDICAL PROVIDER REPORTED  THE CHRONIC DISEASES LISTED ABOVE ARE STABLE AND UNCHANGED/ THE PLAN OF CARE FOR THOSE WILL REMAIN UNCHANGED UNLESS NOTED BELOW  Vit D has normalized and feeling better       The following portions of the patient's history were reviewed and updated as appropriate: allergies, current medications, past family history, past medical history, past social history, past surgical history and problem list     Review of Systems   Constitutional: Negative for activity change and appetite change  HENT: Negative for voice change  Eyes: Negative for visual disturbance  Respiratory: Negative for chest tightness and shortness of breath  Cardiovascular: Negative for chest pain, palpitations and leg swelling  Gastrointestinal: Negative for abdominal pain, blood in stool, constipation and diarrhea  Genitourinary: Negative for dysuria, vaginal bleeding and vaginal discharge  Skin: Negative for rash  Neurological: Negative for dizziness  Psychiatric/Behavioral: Negative for dysphoric mood           Objective:  Vitals:    11/21/19 0904   BP: (!) 80/60   BP Location: Left arm   Patient Position: Sitting   Cuff Size: Adult   Pulse: 64   Temp: 98 5 °F (36 9 °C)   TempSrc: Temporal   SpO2: 96%   Weight: 70 5 kg (155 lb 8 oz)   Height: 5' 3" (1 6 m)      Physical Exam   Constitutional: She appears well-developed and well-nourished  Eyes: Conjunctivae are normal    Pulmonary/Chest: Effort normal    Skin: Skin is warm and dry  Psychiatric: Thought content normal          Patient's chronic problems that were reviewed today are stable  Meds reviewed and no changes made  Appropriate labs and imaging were ordered  Preventive measures appropriate for age and sex were reviewed with patient  Immunizations were updated as appropriate  Get H pylori test for "proof of Cure"   More than half of this 20  minute visit spent counseling and coordinating care

## 2019-11-21 NOTE — PATIENT INSTRUCTIONS
caltrate D : one daily for one month ; then try twice daily ; To avoid constipation take extra fluids in the diet as well as fresh fruits and crunchy vegetables  If the calcium makes you to constipated then just start vitamin-D 2000 units per day  CURRENT [employer] TIPS ON EXERCISE:    IF YOU HAVE CONDITIONS THAT PREVENT AEROBIC EXERCISE:    WALK 30 MINUTES AT LEAST 5 DAYS PER WEEK; MAY BREAK IT UP INTO 10-  15 MINUTE SESSIONS   GET SOME RESISTANCE EXERCISES USING THE MAJOR MUSCLE GROUPS 2-3 TIMES PER WEEK     IF YOU ARE PHYSICALLY ABLE:    TRY TO GET 10,000 STEPS 3 TIMES PER WEEK  PLUS  GO "ONLINE" TO CHECK TARGET HEART RATE FOR YOUR AGE AND DO AEROBIC EXERCISES (JOG/STATIONARY BIKE/ELLIPTICAL) FOR 20-30 MINUTES 2-3 TIMES PER WEEK  IF YOU HAVE NEVER HAD A TDAP SHOT (TETANUS/DIPHTHERIA/PERTUSSIS)  TALK TO YOUR PHARMACIST ABOUT GETTING ONE : GOOD FOR 10 YRS:ESPECIALLY IMPORTATN IF YOU HAVE YOUNG CHILDREN OR GRANDCHILDREN    GET A YEARLY FLU SHOT IN THE FALL : IF OVER 65 GET "HIGH DOSE"     ASK PHARMACIST ABOUT "[address]" THE NEW SHINGLES VACCINE IF OVER AGE 50      Call me a week or 2 after the breath test is completed for the report

## 2019-12-12 ENCOUNTER — OFFICE VISIT (OUTPATIENT)
Dept: FAMILY MEDICINE CLINIC | Facility: CLINIC | Age: 59
End: 2019-12-12
Payer: COMMERCIAL

## 2019-12-12 VITALS
OXYGEN SATURATION: 98 % | HEART RATE: 65 BPM | TEMPERATURE: 97.8 F | DIASTOLIC BLOOD PRESSURE: 80 MMHG | BODY MASS INDEX: 27.41 KG/M2 | WEIGHT: 154.7 LBS | HEIGHT: 63 IN | SYSTOLIC BLOOD PRESSURE: 100 MMHG

## 2019-12-12 DIAGNOSIS — Z11.59 SCREENING FOR VIRAL DISEASE: Primary | ICD-10-CM

## 2019-12-12 DIAGNOSIS — R25.2 CRAMPS, EXTREMITY: ICD-10-CM

## 2019-12-12 PROCEDURE — 3008F BODY MASS INDEX DOCD: CPT | Performed by: FAMILY MEDICINE

## 2019-12-12 PROCEDURE — 1036F TOBACCO NON-USER: CPT | Performed by: FAMILY MEDICINE

## 2019-12-12 PROCEDURE — 99213 OFFICE O/P EST LOW 20 MIN: CPT | Performed by: FAMILY MEDICINE

## 2019-12-12 NOTE — PATIENT INSTRUCTIONS
You need several pairs of medium compression thigh-high venous support stockings  Good place to get this are at private pharmacies  If that is not effective you can  some quinine water  Bib [place]   Spent 2 or 3 minutes doing the leg stretches that I showed you before bedtime  The leg cramps should be much better in 7 days    Tonic water Works well to 8-10 oz at suppertime as very helpful for leg cramps

## 2019-12-12 NOTE — PROGRESS NOTES
Assessment/Plan:    No problem-specific Assessment & Plan notes found for this encounter  Diagnoses and all orders for this visit:    Screening for viral disease    Other orders  -     Cancel: HIV 1/2 AG-AB combo          Subjective:      Patient ID: Sohan Fierro is a 59 y o  female  BLE cramping / edema later in day ; travels in plane occasionally; The following portions of the patient's history were reviewed and updated as appropriate: allergies, current medications, past family history, past medical history, past social history, past surgical history and problem list     Review of Systems      Objective:  Vitals:    12/12/19 1107   BP: 100/80   BP Location: Left arm   Patient Position: Sitting   Cuff Size: Adult   Pulse: 65   Temp: 97 8 °F (36 6 °C)   TempSrc: Temporal   SpO2: 98%   Weight: 70 2 kg (154 lb 11 2 oz)   Height: 5' 3" (1 6 m)      Physical Exam   Constitutional:   Good pulses / mild edema ; no dvt signs /          I believe the major issue here is some element of venous insufficiency  We will discuss various options etc we will also review stretching exercises for cramp avoidance

## 2019-12-13 ENCOUNTER — APPOINTMENT (OUTPATIENT)
Dept: LAB | Facility: HOSPITAL | Age: 59
End: 2019-12-13
Payer: COMMERCIAL

## 2019-12-13 DIAGNOSIS — A04.8 H. PYLORI INFECTION: Chronic | ICD-10-CM

## 2019-12-13 DIAGNOSIS — R25.2 CRAMPS, EXTREMITY: ICD-10-CM

## 2019-12-13 LAB — D DIMER PPP FEU-MCNC: 0.27 UG/ML FEU

## 2019-12-13 PROCEDURE — 83013 H PYLORI (C-13) BREATH: CPT

## 2019-12-13 PROCEDURE — 85379 FIBRIN DEGRADATION QUANT: CPT

## 2019-12-13 PROCEDURE — 83014 H PYLORI DRUG ADMIN: CPT

## 2019-12-13 PROCEDURE — 36415 COLL VENOUS BLD VENIPUNCTURE: CPT

## 2019-12-16 LAB — SCAN RESULT: NORMAL

## 2019-12-17 ENCOUNTER — TELEPHONE (OUTPATIENT)
Dept: FAMILY MEDICINE CLINIC | Facility: CLINIC | Age: 59
End: 2019-12-17

## 2019-12-18 ENCOUNTER — TELEPHONE (OUTPATIENT)
Dept: GASTROENTEROLOGY | Facility: CLINIC | Age: 59
End: 2019-12-18

## 2019-12-18 DIAGNOSIS — A04.8 H. PYLORI INFECTION: Primary | ICD-10-CM

## 2019-12-18 DIAGNOSIS — A04.8 H. PYLORI INFECTION: Primary | Chronic | ICD-10-CM

## 2019-12-18 NOTE — TELEPHONE ENCOUNTER
It is possible that this was positive b/c she had the infection in the past, not that she has it currently  I would recommend stool testing  If this is positive then we would retreat w/ quad therapy  Please make sure she is off PPI for 2 weeks    Donna Calzada

## 2019-12-18 NOTE — TELEPHONE ENCOUNTER
Patients GI provider:  Dr Paola Dubin    Number to return call: [phone]    Reason for call: Pt calling stating she had a h pylori lab test done and it came up positive  Pt wanted to know if she can get another treatment   Pt can be reached at her work number above if you need to speak to her, she will be avail after 12pm    Scheduled procedure/appointment date if applicable: NA

## 2019-12-18 NOTE — TELEPHONE ENCOUNTER
Patient recently had a second positive Hpylori breath test      She was previously treated in July with triple therapy (amoxicillin, clarithromycin, and omeprazole)  How would you like to proceed?

## 2019-12-18 NOTE — TELEPHONE ENCOUNTER
Spoke with patient  She will have hpylori testing done  Patient states she will be leaving out of the country in 1 week, and also dropping her insurance  Advised patient to have stool testing done ASAP and we will follow-up with results

## 2019-12-20 ENCOUNTER — APPOINTMENT (OUTPATIENT)
Dept: LAB | Facility: HOSPITAL | Age: 59
End: 2019-12-20
Attending: INTERNAL MEDICINE
Payer: COMMERCIAL

## 2019-12-20 DIAGNOSIS — A04.8 H. PYLORI INFECTION: ICD-10-CM

## 2019-12-20 PROCEDURE — 87338 HPYLORI STOOL AG IA: CPT

## 2019-12-23 DIAGNOSIS — A04.8 H. PYLORI INFECTION: Primary | ICD-10-CM

## 2019-12-23 LAB — H PYLORI AG STL QL IA: POSITIVE

## 2019-12-23 RX ORDER — OMEPRAZOLE 40 MG/1
40 CAPSULE, DELAYED RELEASE ORAL
Qty: 20 CAPSULE | Refills: 0 | Status: SHIPPED | OUTPATIENT
Start: 2019-12-23 | End: 2019-12-27 | Stop reason: ALTCHOICE

## 2019-12-24 ENCOUNTER — TELEPHONE (OUTPATIENT)
Dept: GASTROENTEROLOGY | Facility: MEDICAL CENTER | Age: 59
End: 2019-12-24

## 2019-12-24 NOTE — TELEPHONE ENCOUNTER
----- Message from Harpreet Hernandez MD sent at 12/17/2019  8:06 AM EST -----  Thank you so much for letting me know  Kassandra Etienne, can we schedule the patient for a follow-up with me?     Thanks,  Roge Wilson      ----- Message -----  From: Whitney Agrawal MD  Sent: 12/16/2019   3:34 PM EST  To: Harpreet Hernandez MD    This patient has persistent + H pylori breath test despite treatment a few mos ago; I have advised her to call you  About any further follow up

## 2019-12-27 ENCOUNTER — OFFICE VISIT (OUTPATIENT)
Dept: FAMILY MEDICINE CLINIC | Facility: CLINIC | Age: 59
End: 2019-12-27
Payer: COMMERCIAL

## 2019-12-27 ENCOUNTER — TELEPHONE (OUTPATIENT)
Dept: GASTROENTEROLOGY | Facility: AMBULARY SURGERY CENTER | Age: 59
End: 2019-12-27

## 2019-12-27 VITALS
DIASTOLIC BLOOD PRESSURE: 78 MMHG | WEIGHT: 155.3 LBS | SYSTOLIC BLOOD PRESSURE: 114 MMHG | TEMPERATURE: 97.8 F | HEART RATE: 64 BPM | HEIGHT: 63 IN | OXYGEN SATURATION: 98 % | BODY MASS INDEX: 27.52 KG/M2

## 2019-12-27 DIAGNOSIS — A04.8 H. PYLORI INFECTION: Primary | ICD-10-CM

## 2019-12-27 DIAGNOSIS — Z11.59 SCREENING FOR VIRAL DISEASE: Primary | ICD-10-CM

## 2019-12-27 DIAGNOSIS — K64.9 HEMORRHOIDS, UNSPECIFIED HEMORRHOID TYPE: ICD-10-CM

## 2019-12-27 PROCEDURE — 3008F BODY MASS INDEX DOCD: CPT | Performed by: FAMILY MEDICINE

## 2019-12-27 PROCEDURE — 99213 OFFICE O/P EST LOW 20 MIN: CPT | Performed by: FAMILY MEDICINE

## 2019-12-27 PROCEDURE — 1036F TOBACCO NON-USER: CPT | Performed by: FAMILY MEDICINE

## 2019-12-27 RX ORDER — AMOXICILLIN 500 MG/1
1000 CAPSULE ORAL EVERY 12 HOURS SCHEDULED
Qty: 56 CAPSULE | Refills: 0 | Status: SHIPPED | OUTPATIENT
Start: 2019-12-27 | End: 2020-01-10

## 2019-12-27 RX ORDER — HYDROCORTISONE ACETATE 25 MG/1
25 SUPPOSITORY RECTAL
Qty: 7 SUPPOSITORY | Refills: 1 | Status: SHIPPED | OUTPATIENT
Start: 2019-12-27 | End: 2020-01-29 | Stop reason: ALTCHOICE

## 2019-12-27 RX ORDER — CLARITHROMYCIN 500 MG/1
500 TABLET, COATED ORAL EVERY 12 HOURS SCHEDULED
Qty: 28 TABLET | Refills: 0 | Status: SHIPPED | OUTPATIENT
Start: 2019-12-27 | End: 2020-01-10

## 2019-12-27 NOTE — TELEPHONE ENCOUNTER
Patient called for alternative medication for h pylori treatment  Omeprazole is covered, but pylera is not

## 2019-12-27 NOTE — TELEPHONE ENCOUNTER
Patients GI provider:  Dr Lewis List    Number to return call: [phone]    Reason for call: Pt called stating her medications is not at the pharmacy      Scheduled procedure/appointment date if applicable: Apt/procedure

## 2019-12-27 NOTE — PROGRESS NOTES
Assessment/Plan:    No problem-specific Assessment & Plan notes found for this encounter  Diagnoses and all orders for this visit:    Screening for viral disease    Hemorrhoids, unspecified hemorrhoid type  -     pramoxine-hydrocortisone cream; Apply topically 3 (three) times a day  -     hydrocortisone (ANUSOL-HC) 25 mg suppository; Insert 1 suppository (25 mg total) into the rectum daily at bedtime    Other orders  -     Cancel: HIV 1/2 AG-AB combo  -     TIMOLOL MALEATE OP; 1 gtt BID OD            Subjective:      Patient ID: Adri Bolivar is a 62 y o  female  BRRB X 2 w/ difficult stool ; colon exam this summer   = hemorrhoids   The following portions of the patient's history were reviewed and updated as appropriate: allergies, current medications, past family history, past medical history, past social history, past surgical history and problem list     Review of Systems      Objective:  Vitals:    12/27/19 0808   BP: 114/78   BP Location: Left arm   Patient Position: Sitting   Cuff Size: Adult   Pulse: 64   Temp: 97 8 °F (36 6 °C)   TempSrc: Temporal   SpO2: 98%   Weight: 70 4 kg (155 lb 4 8 oz)   Height: 5' 3" (1 6 m)      Physical Exam   Abdominal:   Perirectal exam shows some mild external hemorrhoids no thrombosis          story is consistent with hemorrhoidal bleeding  Normal colonoscopy 6 months ago    We will use topicals and stool softeners

## 2019-12-27 NOTE — PATIENT INSTRUCTIONS
Dose of vitamin-D is 1000 units daily   some Metamucil and use 2 tbsp every day with at least 6 oz of liquids  Your bowel movements should be softer in about a week if that is not helping double the dose  No forget to drink plenty of fluids throughout the entire day that helps to keep the stool soft as well  Fresh fruits crunchy vegetables high-fiber cereal oatmeal are also helpful  Use the suppository and rectal cream for 1 week    Think should be much better within a week or 2

## 2019-12-27 NOTE — TELEPHONE ENCOUNTER
pylera is rarely ever covered  I have entered amoxicillin and clarithromycin instead and use omeprazole as planned  She can disregard pylera   Thank you

## 2020-01-28 ENCOUNTER — TELEPHONE (OUTPATIENT)
Dept: GASTROENTEROLOGY | Facility: MEDICAL CENTER | Age: 60
End: 2020-01-28

## 2020-01-28 NOTE — TELEPHONE ENCOUNTER
----- Message from Yaneth Honeycutt MD sent at 12/17/2019  8:06 AM EST -----  Thank you so much for letting me know  Dwain Betts, can we schedule the patient for a follow-up with me?     Thanks,  Braxton Garcia      ----- Message -----  From: Karon De La Fuente MD  Sent: 12/16/2019   3:34 PM EST  To: Yaneth Honeycutt MD    This patient has persistent + H pylori breath test despite treatment a few mos ago; I have advised her to call you  About any further follow up

## 2020-01-29 ENCOUNTER — OFFICE VISIT (OUTPATIENT)
Dept: FAMILY MEDICINE CLINIC | Facility: CLINIC | Age: 60
End: 2020-01-29

## 2020-01-29 VITALS
TEMPERATURE: 102.1 F | WEIGHT: 159 LBS | RESPIRATION RATE: 18 BRPM | HEART RATE: 87 BPM | DIASTOLIC BLOOD PRESSURE: 72 MMHG | HEIGHT: 63 IN | BODY MASS INDEX: 28.17 KG/M2 | SYSTOLIC BLOOD PRESSURE: 104 MMHG | OXYGEN SATURATION: 94 %

## 2020-01-29 DIAGNOSIS — Z11.59 SCREENING FOR VIRAL DISEASE: ICD-10-CM

## 2020-01-29 DIAGNOSIS — J11.1 INFLUENZA: Primary | ICD-10-CM

## 2020-01-29 DIAGNOSIS — R50.9 FEVER, UNSPECIFIED FEVER CAUSE: ICD-10-CM

## 2020-01-29 PROBLEM — H04.123 DRY EYE SYNDROME OF BILATERAL LACRIMAL GLANDS: Status: ACTIVE | Noted: 2020-01-29

## 2020-01-29 PROBLEM — H35.419 LATTICE DEGENERATION OF RETINA: Status: ACTIVE | Noted: 2020-01-29

## 2020-01-29 PROBLEM — M25.50 POLYARTHRALGIA: Status: ACTIVE | Noted: 2018-02-05

## 2020-01-29 PROBLEM — H40.033 ANATOMICAL NARROW ANGLE, BILATERAL: Status: ACTIVE | Noted: 2020-01-29

## 2020-01-29 PROBLEM — H43.813 VITREOUS DEGENERATION, BILATERAL: Status: ACTIVE | Noted: 2020-01-29

## 2020-01-29 PROBLEM — H33.311 HORSESHOE TEAR OF RETINA WITHOUT DETACHMENT, RIGHT EYE: Status: ACTIVE | Noted: 2020-01-29

## 2020-01-29 PROBLEM — H25.13 AGE-RELATED NUCLEAR CATARACT, BILATERAL: Status: ACTIVE | Noted: 2020-01-29

## 2020-01-29 PROBLEM — G89.29 CHRONIC THORACIC BACK PAIN: Status: ACTIVE | Noted: 2017-04-13

## 2020-01-29 PROBLEM — M54.6 CHRONIC THORACIC BACK PAIN: Status: ACTIVE | Noted: 2017-04-13

## 2020-01-29 PROBLEM — R10.2 PELVIC PRESSURE IN FEMALE: Status: ACTIVE | Noted: 2018-09-06

## 2020-01-29 PROCEDURE — 87631 RESP VIRUS 3-5 TARGETS: CPT | Performed by: NURSE PRACTITIONER

## 2020-01-29 PROCEDURE — 99213 OFFICE O/P EST LOW 20 MIN: CPT | Performed by: NURSE PRACTITIONER

## 2020-01-29 RX ORDER — OSELTAMIVIR PHOSPHATE 75 MG/1
75 CAPSULE ORAL EVERY 12 HOURS SCHEDULED
Qty: 10 CAPSULE | Refills: 0 | Status: SHIPPED | OUTPATIENT
Start: 2020-01-29 | End: 2020-02-03

## 2020-01-29 NOTE — LETTER
January 29, 2020     Patient: Susanne Latif   YOB: 1960   Date of Visit: 1/29/2020       To Whom it May Concern:    Susanne Latif is under my professional care  She was seen in my office on 1/29/2020  She may return to work on 2/3/2020  If you have any questions or concerns, please don't hesitate to call           Sincerely,          SHY Shelley        CC: No Recipients

## 2020-01-29 NOTE — PROGRESS NOTES
Assessment/Plan:         Diagnoses and all orders for this visit:    Influenza  -     oseltamivir (TAMIFLU) 75 mg capsule; Take 1 capsule (75 mg total) by mouth every 12 (twelve) hours for 5 days    Screening for viral disease  -     Influenza A/B and RSV PCR    Fever, unspecified fever cause        Screen sent   Encourage fluids  Tylenol as needed   Rest     Subjective:      Patient ID: Marlena Moffett is a 62 y o  female  HPI  Past 24 - 48 hrs achiness increased, nausea , fevers and fatigue   Having palpitations    The following portions of the patient's history were reviewed and updated as appropriate: allergies, current medications, past family history, past medical history, past social history, past surgical history and problem list     Review of Systems      Objective:  Vitals:    01/29/20 1436   BP: 104/72   BP Location: Left arm   Patient Position: Sitting   Cuff Size: Standard   Pulse: 87   Resp: 18   Temp: (!) 102 1 °F (38 9 °C)   TempSrc: Temporal   SpO2: 94%   Weight: 72 1 kg (159 lb)   Height: 5' 3" (1 6 m)      Physical Exam   Constitutional: She is oriented to person, place, and time  She appears distressed  HENT:   Right Ear: External ear normal    Left Ear: External ear normal    Nose: Nose normal    Mouth/Throat: Oropharynx is clear and moist    Eyes: Conjunctivae are normal    Neck: Neck supple  Cardiovascular: Normal rate, regular rhythm and normal heart sounds  Pulmonary/Chest: Effort normal and breath sounds normal    Abdominal: Soft  Bowel sounds are normal  There is no tenderness  Musculoskeletal: Normal range of motion  Lymphadenopathy:     She has no cervical adenopathy  Neurological: She is alert and oriented to person, place, and time  Skin: Skin is warm  Vitals reviewed

## 2020-01-30 LAB
FLUAV RNA NPH QL NAA+PROBE: DETECTED
FLUBV RNA NPH QL NAA+PROBE: ABNORMAL
RSV RNA NPH QL NAA+PROBE: ABNORMAL

## 2020-02-04 ENCOUNTER — TELEPHONE (OUTPATIENT)
Dept: FAMILY MEDICINE CLINIC | Facility: CLINIC | Age: 60
End: 2020-02-04

## 2020-02-04 NOTE — TELEPHONE ENCOUNTER
Patient is calling because she's trying to go back to work  She was seen on 1/29 with Idania, she said the fever broke but she still has a cough and still feels a little dizzy and weak  She wants to know if this is the normal process for the flu or if she needs a follow up  Please advise

## 2020-02-04 NOTE — TELEPHONE ENCOUNTER
Depends on how bad she feels   The cough will last for some time   As long as steadily improving and staying hydrated and now eating should be ok   Any concerns she can set  Up appt

## 2020-02-11 ENCOUNTER — TELEPHONE (OUTPATIENT)
Dept: ADMINISTRATIVE | Facility: OTHER | Age: 60
End: 2020-02-11

## 2020-02-11 NOTE — TELEPHONE ENCOUNTER
Upon review of the In Basket request we were able to locate, review, and update the patient chart as requested for HIV  Any additional questions or concerns should be emailed to the Practice Liaisons via [email or web address] email, please do not reply via In Basket      Thank you  Blanca Dye MA

## 2020-02-11 NOTE — TELEPHONE ENCOUNTER
----- Message from Yao Zuñiga sent at 2/11/2020 11:18 AM EST -----  Please update patients chart  Patient had HIV screening on 05/03/16 you can find results in care everywhere

## 2020-02-13 ENCOUNTER — TELEPHONE (OUTPATIENT)
Dept: FAMILY MEDICINE CLINIC | Facility: CLINIC | Age: 60
End: 2020-02-13

## 2020-02-13 ENCOUNTER — OFFICE VISIT (OUTPATIENT)
Dept: FAMILY MEDICINE CLINIC | Facility: CLINIC | Age: 60
End: 2020-02-13

## 2020-02-13 VITALS
DIASTOLIC BLOOD PRESSURE: 68 MMHG | SYSTOLIC BLOOD PRESSURE: 102 MMHG | WEIGHT: 157.3 LBS | BODY MASS INDEX: 27.87 KG/M2 | TEMPERATURE: 99.8 F | HEART RATE: 67 BPM | OXYGEN SATURATION: 97 % | HEIGHT: 63 IN

## 2020-02-13 DIAGNOSIS — J40 BRONCHITIS: Primary | ICD-10-CM

## 2020-02-13 DIAGNOSIS — R09.82 POST-NASAL DRAINAGE: ICD-10-CM

## 2020-02-13 PROCEDURE — 99214 OFFICE O/P EST MOD 30 MIN: CPT | Performed by: NURSE PRACTITIONER

## 2020-02-13 PROCEDURE — 1036F TOBACCO NON-USER: CPT | Performed by: NURSE PRACTITIONER

## 2020-02-13 PROCEDURE — 3008F BODY MASS INDEX DOCD: CPT | Performed by: NURSE PRACTITIONER

## 2020-02-13 RX ORDER — PREDNISONE 20 MG/1
40 TABLET ORAL DAILY
Qty: 10 TABLET | Refills: 0 | Status: SHIPPED | OUTPATIENT
Start: 2020-02-13 | End: 2020-07-02 | Stop reason: SDUPTHER

## 2020-02-13 RX ORDER — FLUTICASONE PROPIONATE 50 MCG
2 SPRAY, SUSPENSION (ML) NASAL DAILY
Qty: 16 G | Refills: 1 | Status: SHIPPED | OUTPATIENT
Start: 2020-02-13 | End: 2020-02-26 | Stop reason: ALTCHOICE

## 2020-02-13 RX ORDER — BENZONATATE 100 MG/1
100 CAPSULE ORAL 3 TIMES DAILY PRN
Qty: 30 CAPSULE | Refills: 0 | Status: SHIPPED | OUTPATIENT
Start: 2020-02-13 | End: 2020-02-26 | Stop reason: ALTCHOICE

## 2020-02-13 NOTE — PATIENT INSTRUCTIONS

## 2020-02-13 NOTE — PROGRESS NOTES
Subjective:   Chief Complaint   Patient presents with    Cough        Patient ID: Erasmo Blanco is a 62 y o  female  Presents today with complaints of worsening cough post influenza  States that the coughing is significantly worse expressly at night  She does feel somewhat short of breath  She is taking Robitussin for the cough      The following portions of the patient's history were reviewed and updated as appropriate: allergies, current medications, past family history, past medical history, past social history, past surgical history and problem list     Review of Systems   Constitutional: Positive for chills and fatigue  Negative for fever  HENT: Positive for postnasal drip  Negative for congestion  Respiratory: Positive for cough and shortness of breath  Negative for wheezing  Cardiovascular: Negative for chest pain, palpitations and leg swelling  Psychiatric/Behavioral: Negative for dysphoric mood  The patient is not nervous/anxious  Objective:  Vitals:    02/13/20 0915   BP: 102/68   BP Location: Left arm   Patient Position: Sitting   Cuff Size: Adult   Pulse: 67   Temp: 99 8 °F (37 7 °C)   TempSrc: Tympanic   SpO2: 97%   Weight: 71 4 kg (157 lb 4 8 oz)   Height: 5' 3" (1 6 m)      Physical Exam   Constitutional: She is oriented to person, place, and time  She appears well-developed and well-nourished  HENT:   Right Ear: Tympanic membrane and ear canal normal    Left Ear: Tympanic membrane and ear canal normal    Nose: Mucosal edema and rhinorrhea present  Mouth/Throat: Uvula is midline, oropharynx is clear and moist and mucous membranes are normal    Neck: Normal range of motion  Neck supple  Cardiovascular: Normal rate, regular rhythm, normal heart sounds and intact distal pulses  Pulmonary/Chest: Effort normal  She has no decreased breath sounds  She has no wheezes  She has no rhonchi  She has no rales     Coarse bilateral breath sounds   Lymphadenopathy: She has no cervical adenopathy  Neurological: She is alert and oriented to person, place, and time  Skin: Skin is warm and dry  Psychiatric: She has a normal mood and affect  Her behavior is normal          Assessment/Plan:    No problem-specific Assessment & Plan notes found for this encounter  Diagnoses and all orders for this visit:    Bronchitis  -     predniSONE 20 mg tablet; Take 2 tablets (40 mg total) by mouth daily for 5 days  -     benzonatate (TESSALON PERLES) 100 mg capsule;  Take 1 capsule (100 mg total) by mouth 3 (three) times a day as needed for cough    Post-nasal drainage  -     fluticasone (FLONASE) 50 mcg/act nasal spray; 2 sprays into each nostril daily

## 2020-02-26 ENCOUNTER — OFFICE VISIT (OUTPATIENT)
Dept: FAMILY MEDICINE CLINIC | Facility: CLINIC | Age: 60
End: 2020-02-26

## 2020-02-26 ENCOUNTER — PATIENT OUTREACH (OUTPATIENT)
Dept: FAMILY MEDICINE CLINIC | Facility: CLINIC | Age: 60
End: 2020-02-26

## 2020-02-26 VITALS
TEMPERATURE: 98.3 F | RESPIRATION RATE: 18 BRPM | WEIGHT: 158.8 LBS | HEART RATE: 60 BPM | OXYGEN SATURATION: 97 % | DIASTOLIC BLOOD PRESSURE: 64 MMHG | HEIGHT: 63 IN | SYSTOLIC BLOOD PRESSURE: 102 MMHG | BODY MASS INDEX: 28.14 KG/M2

## 2020-02-26 DIAGNOSIS — Z59.89 DOES NOT HAVE HEALTH INSURANCE: Primary | ICD-10-CM

## 2020-02-26 DIAGNOSIS — N64.4 BREAST PAIN: Primary | ICD-10-CM

## 2020-02-26 PROCEDURE — 3008F BODY MASS INDEX DOCD: CPT | Performed by: NURSE PRACTITIONER

## 2020-02-26 PROCEDURE — 1036F TOBACCO NON-USER: CPT | Performed by: NURSE PRACTITIONER

## 2020-02-26 PROCEDURE — 99212 OFFICE O/P EST SF 10 MIN: CPT | Performed by: NURSE PRACTITIONER

## 2020-02-26 SDOH — ECONOMIC STABILITY - INCOME SECURITY: OTHER PROBLEMS RELATED TO HOUSING AND ECONOMIC CIRCUMSTANCES: Z59.89

## 2020-02-26 NOTE — PROGRESS NOTES
[facility]  Patient has no insurance  In basket message sent to LOURDES Ponce

## 2020-02-26 NOTE — PROGRESS NOTES
Assessment/Plan:         Diagnoses and all orders for this visit:    Breast pain  -     US breast screening bilateral complete (ABUS); Future          Subjective:      Patient ID: Morena Peters is a 62 y o  female  HPI  Having left axilla pain and radiates to nipple  Had mammogram in Sept is was ok  Pain has been going on since October  Not getting better  Has no insurance  Only once of coffee per day  Has breast implants  Has had for years  Can get u/s   The following portions of the patient's history were reviewed and updated as appropriate: allergies, current medications, past family history, past medical history, past social history, past surgical history and problem list     Review of Systems   Constitutional: Negative for activity change, appetite change, chills, fatigue and fever  HENT: Negative for congestion, postnasal drip and sore throat  Respiratory: Negative for cough  Cardiovascular: Positive for chest pain  Gastrointestinal: Negative for abdominal pain  Neurological: Negative for dizziness, light-headedness and numbness  Objective:  Vitals:    02/26/20 0653   BP: 102/64   BP Location: Left arm   Patient Position: Sitting   Cuff Size: Standard   Pulse: 60   Resp: 18   Temp: 98 3 °F (36 8 °C)   TempSrc: Temporal   SpO2: 97%   Weight: 72 kg (158 lb 12 8 oz)   Height: 5' 3" (1 6 m)      Physical Exam   Constitutional: She appears well-developed and well-nourished  Pulmonary/Chest: Right breast exhibits tenderness  Left breast exhibits tenderness  Left breast exhibits no mass  No breast tenderness  Pain across breasts to nipple  Vitals reviewed

## 2020-02-27 ENCOUNTER — PATIENT OUTREACH (OUTPATIENT)
Dept: CASE MANAGEMENT | Facility: OTHER | Age: 60
End: 2020-02-27

## 2020-02-27 NOTE — PROGRESS NOTES
[employer] SW intern left message for patient requesting call back regarding insurance difficulties  [employer] SW intern received incoming call from patient  Patient stated that she does not qualify for healthcare coverage through marketplace because she declined it in January and could not afford the premium she was given then  Patient does not qualify for MA due to income  No other insurance resources available to provide patient   Patient had no other needs

## 2020-03-02 ENCOUNTER — TELEPHONE (OUTPATIENT)
Dept: FAMILY MEDICINE CLINIC | Facility: CLINIC | Age: 60
End: 2020-03-02

## 2020-03-02 NOTE — TELEPHONE ENCOUNTER
Elli Santiago from [address] called   RE: email will be send over to request for script for diagnostic Bilateral mammogram

## 2020-03-06 ENCOUNTER — HOSPITAL ENCOUNTER (OUTPATIENT)
Dept: ULTRASOUND IMAGING | Facility: CLINIC | Age: 60
Discharge: HOME | End: 2020-03-06

## 2020-03-06 ENCOUNTER — HOSPITAL ENCOUNTER (OUTPATIENT)
Dept: MAMMOGRAPHY | Facility: CLINIC | Age: 60
Discharge: HOME | End: 2020-03-06

## 2020-03-06 VITALS — WEIGHT: 158 LBS | BODY MASS INDEX: 28 KG/M2 | HEIGHT: 63 IN

## 2020-03-06 DIAGNOSIS — N64.4 BREAST PAIN: ICD-10-CM

## 2020-03-06 PROCEDURE — 77066 DX MAMMO INCL CAD BI: CPT

## 2020-03-06 PROCEDURE — G0279 TOMOSYNTHESIS, MAMMO: HCPCS

## 2020-03-06 PROCEDURE — 76642 ULTRASOUND BREAST LIMITED: CPT

## 2020-03-27 ENCOUNTER — TELEPHONE (OUTPATIENT)
Dept: FAMILY MEDICINE CLINIC | Facility: CLINIC | Age: 60
End: 2020-03-27

## 2020-03-27 ENCOUNTER — TELEMEDICINE (OUTPATIENT)
Dept: FAMILY MEDICINE CLINIC | Facility: CLINIC | Age: 60
End: 2020-03-27

## 2020-03-27 DIAGNOSIS — J02.9 SORE THROAT: Primary | ICD-10-CM

## 2020-03-27 PROCEDURE — G2012 BRIEF CHECK IN BY MD/QHP: HCPCS | Performed by: FAMILY MEDICINE

## 2020-03-27 RX ORDER — MELOXICAM 15 MG/1
15 TABLET ORAL DAILY
COMMUNITY
Start: 2019-03-04 | End: 2020-06-04 | Stop reason: ALTCHOICE

## 2020-03-27 NOTE — PROGRESS NOTES
Virtual Regular Visit    Problem List Items Addressed This Visit        Other    Sore throat - Primary               Reason for visit is sick visit     Encounter provider Niranjan Cerda MD    Provider located at [address] [place] [id number]      Recent Visits  No visits were found meeting these conditions  Showing recent visits within past 7 days and meeting all other requirements     Today's Visits  Date Type Provider Dept   03/27/20 Telemedicine Niranjan Cerda, [address]   03/27/20 Telephone Niranjan Cerda  [facility] today's visits and meeting all other requirements     Future Appointments  Date Type Provider Dept   03/27/20 Telemedicine Niranjan Cerda MD [address] [facility]   Showing future appointments within next 150 days and meeting all other requirements        After connecting through [employer], the patient was identified by name and date of birth  Tylor Calderon was informed that this is a telemedicine visit and that the visit is being conducted through telephone  and patient was informed that this is not a secure, HIPAA-complaint platform  she agrees to proceed  which may not be secure and therefore, might not be HIPAA-compliant  My office door was closed  No one else was in the room  She acknowledged consent and understanding of privacy and security of the video platform  The patient has agreed to participate and understands they can discontinue the visit at any time      Subjective  Tylor Calderon is a 62 y o  female : sore throat 3-4 days / PND/         Past Medical History:   Diagnosis Date    Abnormal Pap smear of cervix     Colon polyp     Glaucoma     Heart palpitations     has port to monitor; none since 2009    Hyperlipemia     Mixed anxiety and depressive disorder 9/16/2015    MVP (mitral valve prolapse) 1985       Past Surgical History:   Procedure Laterality Date    AUGMENTATION MAMMAPLASTY  2000    BREAST IMPLANT Bilateral     CARDIAC CATHETERIZATION  2009    normal    CARDIAC LOOP RECORDER      CHOLECYSTECTOMY  1995    COLONOSCOPY      HERNIA REPAIR      umbilical    KNEE DISLOCATION SURGERY Right     TOTAL ABDOMINAL HYSTERECTOMY  1999    with LSO; ? 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 -- patient unclear with history    TUBAL LIGATION      WRIST SURGERY Left     states there are screws but not metal   Can go through a metal detector       Current Outpatient Medications   Medication Sig Dispense Refill    cycloSPORINE (RESTASIS MULTIDOSE) 0 05 % ophthalmic emulsion Administer to both eyes every 12 (twelve) hours       meloxicam (MOBIC) 15 mg tablet Take 15 mg by mouth daily      TIMOLOL MALEATE OP 1 gtt BID OD       No current facility-administered medications for this visit  Allergies   Allergen Reactions    Iodine Hives and Swelling     12/15/10    Other reaction(s): ANAPHYLACTIC SHOCK    Brimonidine Tartrate     Nuts Itching     Any kind of nuts    Shellfish Allergy     Shellfish-Derived Products Hives and Swelling    Brimonidine Itching and Palpitations       Review of Systems   Constitutional: Positive for fatigue  Negative for fever  HENT: Positive for postnasal drip and sore throat  Negative for ear pain  Respiratory: Negative for cough and shortness of breath  Cardiovascular: Negative for chest pain  throat red to appearance but no exudate   Sounds like viral pharyngitis     Sx rx     I spent 15 minutes with the patient during this visit

## 2020-03-27 NOTE — TELEPHONE ENCOUNTER
Patient has had a sore throat for about two days now  She said it is red, a little swollen and hard to swallow  She has a little bit of a runny nose, but no congestion or anything  She has no other symptoms, no cough, no fever, no shortness of breath, no travel, and no exposure as far as she knows to anyone suspected or confirmed of covid-19  She isn't sure if she even needs an appointment but wants to make sure this doesn't sound like anything serious, might just be her allergies  Please advise

## 2020-04-21 ENCOUNTER — TELEPHONE (OUTPATIENT)
Dept: FAMILY MEDICINE CLINIC | Facility: CLINIC | Age: 60
End: 2020-04-21

## 2020-04-21 ENCOUNTER — TELEMEDICINE (OUTPATIENT)
Dept: FAMILY MEDICINE CLINIC | Facility: CLINIC | Age: 60
End: 2020-04-21

## 2020-04-21 DIAGNOSIS — H92.01 RIGHT EAR PAIN: Primary | ICD-10-CM

## 2020-04-21 PROCEDURE — G2012 BRIEF CHECK IN BY MD/QHP: HCPCS | Performed by: FAMILY MEDICINE

## 2020-04-27 ENCOUNTER — TELEPHONE (OUTPATIENT)
Dept: FAMILY MEDICINE CLINIC | Facility: CLINIC | Age: 60
End: 2020-04-27

## 2020-05-21 ENCOUNTER — OFFICE VISIT (OUTPATIENT)
Dept: FAMILY MEDICINE CLINIC | Facility: CLINIC | Age: 60
End: 2020-05-21

## 2020-05-21 VITALS
HEART RATE: 70 BPM | HEIGHT: 63 IN | BODY MASS INDEX: 28.39 KG/M2 | TEMPERATURE: 98.6 F | OXYGEN SATURATION: 97 % | DIASTOLIC BLOOD PRESSURE: 80 MMHG | RESPIRATION RATE: 17 BRPM | SYSTOLIC BLOOD PRESSURE: 122 MMHG | WEIGHT: 160.2 LBS

## 2020-05-21 DIAGNOSIS — M79.89 MASS OF SOFT TISSUE OF UPPER ARM: ICD-10-CM

## 2020-05-21 DIAGNOSIS — G89.29 CHRONIC LEFT HIP PAIN: Primary | ICD-10-CM

## 2020-05-21 DIAGNOSIS — M25.552 CHRONIC LEFT HIP PAIN: Primary | ICD-10-CM

## 2020-05-21 PROCEDURE — 1036F TOBACCO NON-USER: CPT | Performed by: NURSE PRACTITIONER

## 2020-05-21 PROCEDURE — 99213 OFFICE O/P EST LOW 20 MIN: CPT | Performed by: NURSE PRACTITIONER

## 2020-05-21 PROCEDURE — 3008F BODY MASS INDEX DOCD: CPT | Performed by: NURSE PRACTITIONER

## 2020-06-02 ENCOUNTER — APPOINTMENT (OUTPATIENT)
Dept: RADIOLOGY | Facility: MEDICAL CENTER | Age: 60
End: 2020-06-02
Payer: COMMERCIAL

## 2020-06-02 DIAGNOSIS — G89.29 CHRONIC LEFT HIP PAIN: ICD-10-CM

## 2020-06-02 DIAGNOSIS — M25.552 CHRONIC LEFT HIP PAIN: ICD-10-CM

## 2020-06-02 PROCEDURE — 73502 X-RAY EXAM HIP UNI 2-3 VIEWS: CPT

## 2020-06-04 ENCOUNTER — OFFICE VISIT (OUTPATIENT)
Dept: FAMILY MEDICINE CLINIC | Facility: CLINIC | Age: 60
End: 2020-06-04

## 2020-06-04 VITALS
HEIGHT: 63 IN | SYSTOLIC BLOOD PRESSURE: 100 MMHG | OXYGEN SATURATION: 98 % | DIASTOLIC BLOOD PRESSURE: 70 MMHG | WEIGHT: 160.8 LBS | HEART RATE: 73 BPM | BODY MASS INDEX: 28.49 KG/M2 | TEMPERATURE: 98.4 F

## 2020-06-04 DIAGNOSIS — M54.30 SCIATIC LEG PAIN: Chronic | ICD-10-CM

## 2020-06-04 DIAGNOSIS — R13.14 DYSPHAGIA, PHARYNGOESOPHAGEAL PHASE: ICD-10-CM

## 2020-06-04 PROCEDURE — 1036F TOBACCO NON-USER: CPT | Performed by: FAMILY MEDICINE

## 2020-06-04 PROCEDURE — 99213 OFFICE O/P EST LOW 20 MIN: CPT | Performed by: FAMILY MEDICINE

## 2020-06-04 PROCEDURE — 3008F BODY MASS INDEX DOCD: CPT | Performed by: FAMILY MEDICINE

## 2020-06-04 RX ORDER — METHOCARBAMOL 500 MG/1
500 TABLET, FILM COATED ORAL 3 TIMES DAILY PRN
COMMUNITY
Start: 2020-06-03 | End: 2020-06-18 | Stop reason: ALTCHOICE

## 2020-06-04 RX ORDER — MELOXICAM 15 MG/1
15 TABLET ORAL DAILY
Qty: 10 TABLET | Refills: 1 | Status: SHIPPED | OUTPATIENT
Start: 2020-06-04 | End: 2020-06-18 | Stop reason: ALTCHOICE

## 2020-06-10 ENCOUNTER — TELEPHONE (OUTPATIENT)
Dept: FAMILY MEDICINE CLINIC | Facility: CLINIC | Age: 60
End: 2020-06-10

## 2020-06-18 ENCOUNTER — OFFICE VISIT (OUTPATIENT)
Dept: FAMILY MEDICINE CLINIC | Facility: CLINIC | Age: 60
End: 2020-06-18

## 2020-06-18 VITALS
HEART RATE: 63 BPM | HEIGHT: 63 IN | DIASTOLIC BLOOD PRESSURE: 90 MMHG | OXYGEN SATURATION: 97 % | BODY MASS INDEX: 28.37 KG/M2 | SYSTOLIC BLOOD PRESSURE: 120 MMHG | TEMPERATURE: 97.6 F | WEIGHT: 160.1 LBS

## 2020-06-18 DIAGNOSIS — M54.30 SCIATIC LEG PAIN: Primary | ICD-10-CM

## 2020-06-18 PROCEDURE — 99213 OFFICE O/P EST LOW 20 MIN: CPT | Performed by: FAMILY MEDICINE

## 2020-06-18 PROCEDURE — 1036F TOBACCO NON-USER: CPT | Performed by: FAMILY MEDICINE

## 2020-06-18 PROCEDURE — 3008F BODY MASS INDEX DOCD: CPT | Performed by: FAMILY MEDICINE

## 2020-06-18 RX ORDER — MELOXICAM 15 MG/1
15 TABLET ORAL DAILY
Qty: 20 TABLET | Refills: 2 | Status: SHIPPED | OUTPATIENT
Start: 2020-06-18 | End: 2020-07-02 | Stop reason: SDUPTHER

## 2020-07-02 ENCOUNTER — TELEPHONE (OUTPATIENT)
Dept: FAMILY MEDICINE CLINIC | Facility: CLINIC | Age: 60
End: 2020-07-02

## 2020-07-02 ENCOUNTER — TREATMENT (OUTPATIENT)
Dept: FAMILY MEDICINE CLINIC | Facility: CLINIC | Age: 60
End: 2020-07-02

## 2020-07-02 DIAGNOSIS — M54.30 SCIATIC LEG PAIN: ICD-10-CM

## 2020-07-02 DIAGNOSIS — J40 BRONCHITIS: ICD-10-CM

## 2020-07-02 RX ORDER — PREDNISONE 20 MG/1
40 TABLET ORAL DAILY
Qty: 14 TABLET | Refills: 0 | Status: SHIPPED | OUTPATIENT
Start: 2020-07-02 | End: 2020-07-09

## 2020-07-02 RX ORDER — MELOXICAM 15 MG/1
15 TABLET ORAL DAILY
Qty: 10 TABLET | Refills: 0 | Status: SHIPPED | OUTPATIENT
Start: 2020-07-02 | End: 2020-07-31

## 2020-07-02 NOTE — TELEPHONE ENCOUNTER
Patient calling to let Dr Anup Luis know that she has been taking the meloxicam, she has 3 tablets left but she is very uncomfortable and dosen't feel like it helps the pain  Patient would like to know what the next step would be  Please advise

## 2020-07-02 NOTE — TELEPHONE ENCOUNTER
Tell her to stop the meloxicam at this time  I am going to call in a prescription for prednisone she is to take 2 tablets daily for 1 week  Follow that by resuming meloxicam for an additional 10 days    At the end of that time she should be doing much better if not call me back and at that point we may need to do more investigating

## 2020-07-31 ENCOUNTER — OFFICE VISIT (OUTPATIENT)
Dept: FAMILY MEDICINE CLINIC | Facility: CLINIC | Age: 60
End: 2020-07-31

## 2020-07-31 VITALS
SYSTOLIC BLOOD PRESSURE: 120 MMHG | DIASTOLIC BLOOD PRESSURE: 80 MMHG | OXYGEN SATURATION: 98 % | HEART RATE: 58 BPM | RESPIRATION RATE: 16 BRPM | BODY MASS INDEX: 28.42 KG/M2 | WEIGHT: 160.4 LBS | HEIGHT: 63 IN | TEMPERATURE: 98.1 F

## 2020-07-31 DIAGNOSIS — M54.30 SCIATIC LEG PAIN: ICD-10-CM

## 2020-07-31 PROCEDURE — 1036F TOBACCO NON-USER: CPT | Performed by: FAMILY MEDICINE

## 2020-07-31 PROCEDURE — 99213 OFFICE O/P EST LOW 20 MIN: CPT | Performed by: FAMILY MEDICINE

## 2020-07-31 PROCEDURE — 3008F BODY MASS INDEX DOCD: CPT | Performed by: FAMILY MEDICINE

## 2020-07-31 RX ORDER — MELOXICAM 15 MG/1
15 TABLET ORAL DAILY
Qty: 30 TABLET | Refills: 2 | Status: SHIPPED | OUTPATIENT
Start: 2020-07-31 | End: 2020-09-11

## 2020-07-31 NOTE — PATIENT INSTRUCTIONS
Resume meloxicam 15 mg once daily take this for a full 6 weeks  In addition take omeprazole 20 mg once daily to protect stomach  Return at that time we will decide whether MRI is indicated

## 2020-07-31 NOTE — PROGRESS NOTES
Assessment/Plan:    No problem-specific Assessment & Plan notes found for this encounter  Diagnoses and all orders for this visit:    Sciatic leg pain          Subjective:      Patient ID: Gardenia Rojas is a 62 y o  female  Patient comes to follow-up the sciatica  She is not improved having taken both steroids and several anti-inflammatories  She gets partial relief from the pain when she is on those medicines but never totally goes away she continues to have issues with back and left lower extremity pain  The following portions of the patient's history were reviewed and updated as appropriate: allergies, current medications, past family history, past medical history, past social history, past surgical history and problem list     Review of Systems   Musculoskeletal: Positive for back pain  Objective:  Vitals:    07/31/20 1115   BP: 120/80   BP Location: Left arm   Patient Position: Sitting   Cuff Size: Standard   Pulse: 58   Resp: 16   Temp: 98 1 °F (36 7 °C)   TempSrc: Tympanic   SpO2: 98%   Weight: 72 8 kg (160 lb 6 4 oz)   Height: 5' 3" (1 6 m)      Physical Exam   Constitutional: She appears well-developed and well-nourished  HENT:   Head: Normocephalic and atraumatic  Eyes: Conjunctivae are normal    Neck: Neck supple  No thyromegaly present  Cardiovascular: Normal rate, regular rhythm, normal heart sounds and intact distal pulses  No murmur heard  Pulmonary/Chest: Effort normal and breath sounds normal  No respiratory distress  Musculoskeletal: She exhibits no edema  Lymphadenopathy:     She has no cervical adenopathy  Skin: Skin is warm and dry  Psychiatric: She has a normal mood and affect  Her behavior is normal  Thought content normal            Patient with continued sciatic on left side with a reduced patellar reflex  She is currently what without health insurance hence epidural steroids an MRI would be quite expensive    Since she does get good relief with the meloxicam we will continue that along for 4-6 weeks along with a stomach protector

## 2020-08-28 ENCOUNTER — OFFICE VISIT (OUTPATIENT)
Dept: FAMILY MEDICINE CLINIC | Facility: CLINIC | Age: 60
End: 2020-08-28

## 2020-08-28 VITALS
SYSTOLIC BLOOD PRESSURE: 120 MMHG | DIASTOLIC BLOOD PRESSURE: 84 MMHG | TEMPERATURE: 97.6 F | WEIGHT: 158.4 LBS | HEART RATE: 62 BPM | OXYGEN SATURATION: 99 % | HEIGHT: 63 IN | BODY MASS INDEX: 28.07 KG/M2

## 2020-08-28 DIAGNOSIS — R55 NEAR SYNCOPE: ICD-10-CM

## 2020-08-28 DIAGNOSIS — G45.9 TIA (TRANSIENT ISCHEMIC ATTACK): Primary | ICD-10-CM

## 2020-08-28 PROCEDURE — 3008F BODY MASS INDEX DOCD: CPT | Performed by: NURSE PRACTITIONER

## 2020-08-28 PROCEDURE — 1036F TOBACCO NON-USER: CPT | Performed by: NURSE PRACTITIONER

## 2020-08-28 PROCEDURE — 99214 OFFICE O/P EST MOD 30 MIN: CPT | Performed by: NURSE PRACTITIONER

## 2020-08-28 NOTE — PATIENT INSTRUCTIONS

## 2020-08-28 NOTE — PROGRESS NOTES
Subjective:   Chief Complaint   Patient presents with   Marilin Leonard     er follow up         Patient ID: Mary Kim is a 62 y o  female  Presents today for a transition of care appointment  Patient was hospitalized on August 24th secondary to feeling of weakness, confusion, inability to speak, and dizziness  Her CT, MRI, MRA of the head were negative except for some frontal loss of brain tissue  She was found a high of high cholesterol during this time and her blood pressure was elevated  She was placed on atorvastatin on discharge  She has not had any lingering symptoms from her episode other than stated inability to concentrate times  She is concerned that her blood pressure remains elevated especially systolic and believes that it is her meloxicam that is causing her blood pressure to be higher than normal   Discussed with patient staying off the meloxicam for week and coming back for a nurse visit to have her blood pressure rechecked and a follow-up appointment in 2 weeks  Her diagnosis was TIA at the time discharge  Reviewed all signs and symptoms of stroke and to seek emergent care immediately as time is brain  The following portions of the patient's history were reviewed and updated as appropriate: allergies, current medications, past family history, past medical history, past social history, past surgical history and problem list     Review of Systems   Constitutional: Negative for chills, fatigue and fever  HENT: Positive for ear pain  Respiratory: Negative for cough, shortness of breath and wheezing  Cardiovascular: Negative for chest pain, palpitations and leg swelling  Neurological: Positive for light-headedness  Negative for dizziness and headaches  Psychiatric/Behavioral: Negative for dysphoric mood  The patient is not nervous/anxious                Objective:  Vitals:    08/28/20 1513 08/28/20 1610   BP: 120/90 120/84   BP Location: Left arm Left arm   Patient Position: Sitting Sitting   Cuff Size: Adult Adult   Pulse: 62    Temp: 97 6 °F (36 4 °C)    TempSrc: Temporal    SpO2: 99%    Weight: 71 8 kg (158 lb 6 4 oz)    Height: 5' 3" (1 6 m)       Physical Exam  Constitutional:       Appearance: Normal appearance  HENT:      Right Ear: Tympanic membrane and ear canal normal  There is no impacted cerumen  Left Ear: Tympanic membrane and ear canal normal  There is no impacted cerumen  Nose: No congestion or rhinorrhea  Eyes:      General:         Right eye: No discharge  Left eye: No discharge  Extraocular Movements: Extraocular movements intact  Conjunctiva/sclera: Conjunctivae normal       Pupils: Pupils are equal, round, and reactive to light  Neck:      Musculoskeletal: Normal range of motion and neck supple  Vascular: No carotid bruit  Cardiovascular:      Rate and Rhythm: Normal rate and regular rhythm  Pulses: Normal pulses  Heart sounds: Normal heart sounds  Pulmonary:      Effort: Pulmonary effort is normal       Breath sounds: Normal breath sounds  No wheezing  Lymphadenopathy:      Cervical: No cervical adenopathy  Skin:     General: Skin is warm and dry  Neurological:      Mental Status: She is alert and oriented to person, place, and time  Psychiatric:         Mood and Affect: Mood normal          Behavior: Behavior normal            Assessment/Plan:    No problem-specific Assessment & Plan notes found for this encounter         Diagnoses and all orders for this visit:    TIA (transient ischemic attack)  Comments:  Discussed signs and symptoms of stroke reviewed    Near syncope

## 2020-09-04 ENCOUNTER — CLINICAL SUPPORT (OUTPATIENT)
Dept: FAMILY MEDICINE CLINIC | Facility: CLINIC | Age: 60
End: 2020-09-04

## 2020-09-04 VITALS — SYSTOLIC BLOOD PRESSURE: 122 MMHG | DIASTOLIC BLOOD PRESSURE: 68 MMHG

## 2020-09-04 DIAGNOSIS — G45.9 TIA (TRANSIENT ISCHEMIC ATTACK): Primary | ICD-10-CM

## 2020-09-11 ENCOUNTER — OFFICE VISIT (OUTPATIENT)
Dept: FAMILY MEDICINE CLINIC | Facility: CLINIC | Age: 60
End: 2020-09-11

## 2020-09-11 VITALS
HEART RATE: 57 BPM | WEIGHT: 156.2 LBS | HEIGHT: 63 IN | TEMPERATURE: 97.7 F | SYSTOLIC BLOOD PRESSURE: 106 MMHG | BODY MASS INDEX: 27.68 KG/M2 | RESPIRATION RATE: 14 BRPM | DIASTOLIC BLOOD PRESSURE: 60 MMHG

## 2020-09-11 DIAGNOSIS — M54.30 SCIATIC LEG PAIN: ICD-10-CM

## 2020-09-11 DIAGNOSIS — R55 NEAR SYNCOPE: Primary | ICD-10-CM

## 2020-09-11 PROCEDURE — 99214 OFFICE O/P EST MOD 30 MIN: CPT | Performed by: FAMILY MEDICINE

## 2020-09-11 NOTE — PROGRESS NOTES
F/U above :  BP controlled at home since off NSAID  Assessment/Plan:    No problem-specific Assessment & Plan notes found for this encounter  Diagnoses and all orders for this visit:    Near syncope    Sciatic leg pain          Subjective:      Patient ID: Rahul Elias is a 62 y o  female  F/U above : Patient was admitted for an overnight stay with a feeling of head pressure and generalized neurological complaints  She was deemed to have had a brief TIA but the record review and review of patient's history are soft on that finding  There never was much lateralization  The only symptom that might have suggested TIA was the difficulty with speech on a temporary basis  and near syncopal event  Since being home she is not on any medications for blood pressure and that is well controlled it is possible meloxicam have been elevating her blood pressure  She is a taking a low-dose statin drug plus an aspirin since being discharged  Since being home she is noticing that her legs are tingly and numb particularly at nighttime  This is new since being in the hospital  Also a generalized muscular weakness in noted   Notes some   Difficulty with memory and higher intellectual activity since this happened  Although it is improving over the last week since she is home it is still not back to normal       The following portions of the patient's history were reviewed and updated as appropriate: allergies, current medications, past family history, past medical history, past social history, past surgical history and problem list     Review of Systems   Constitutional: Negative for activity change and appetite change  HENT: Negative for voice change  Eyes: Negative for visual disturbance  Respiratory: Negative for chest tightness and shortness of breath  Cardiovascular: Negative for chest pain, palpitations and leg swelling     Gastrointestinal: Negative for abdominal pain, blood in stool, constipation and diarrhea  Genitourinary: Negative for dysuria, vaginal bleeding and vaginal discharge  Skin: Negative for rash  Neurological: Negative for dizziness  Psychiatric/Behavioral: Negative for dysphoric mood  Objective:  Vitals:    09/11/20 1427   BP: 106/60   BP Location: Left arm   Patient Position: Sitting   Cuff Size: Standard   Pulse: 57   Resp: 14   Temp: 97 7 °F (36 5 °C)   Weight: 70 9 kg (156 lb 3 2 oz)   Height: 5' 3" (1 6 m)      Physical Exam  Constitutional:       Appearance: She is well-developed  HENT:      Head: Normocephalic and atraumatic  Eyes:      Conjunctiva/sclera: Conjunctivae normal    Neck:      Musculoskeletal: Neck supple  Thyroid: No thyromegaly  Cardiovascular:      Rate and Rhythm: Normal rate and regular rhythm  Heart sounds: Normal heart sounds  No murmur  Pulmonary:      Effort: Pulmonary effort is normal  No respiratory distress  Breath sounds: Normal breath sounds  Lymphadenopathy:      Cervical: No cervical adenopathy  Skin:     General: Skin is warm and dry     Psychiatric:         Behavior: Behavior normal

## 2020-09-11 NOTE — PATIENT INSTRUCTIONS
I believe a lot of the way you are feeling at this time is a side effect of the statin drug  Stop that and stop the aspirin  Begin some sort of regular exercise but do not aggravate her sciatica    Go back to any regular diet that you were on before no restrictions at this time

## 2020-10-20 ENCOUNTER — OFFICE VISIT (OUTPATIENT)
Dept: FAMILY MEDICINE CLINIC | Facility: CLINIC | Age: 60
End: 2020-10-20

## 2020-10-20 VITALS
DIASTOLIC BLOOD PRESSURE: 90 MMHG | BODY MASS INDEX: 26.58 KG/M2 | HEART RATE: 80 BPM | WEIGHT: 150 LBS | SYSTOLIC BLOOD PRESSURE: 150 MMHG | TEMPERATURE: 98 F | OXYGEN SATURATION: 98 % | HEIGHT: 63 IN

## 2020-10-20 DIAGNOSIS — Z23 IMMUNIZATION DUE: Primary | ICD-10-CM

## 2020-10-20 DIAGNOSIS — R55 NEAR SYNCOPE: ICD-10-CM

## 2020-10-20 DIAGNOSIS — G45.9 TIA (TRANSIENT ISCHEMIC ATTACK): ICD-10-CM

## 2020-10-20 PROCEDURE — 99213 OFFICE O/P EST LOW 20 MIN: CPT | Performed by: FAMILY MEDICINE

## 2020-10-26 ENCOUNTER — TELEPHONE (OUTPATIENT)
Dept: FAMILY MEDICINE CLINIC | Facility: CLINIC | Age: 60
End: 2020-10-26

## 2020-10-26 ENCOUNTER — TREATMENT (OUTPATIENT)
Dept: FAMILY MEDICINE CLINIC | Facility: CLINIC | Age: 60
End: 2020-10-26
Payer: OTHER GOVERNMENT

## 2020-10-26 PROCEDURE — 99212 OFFICE O/P EST SF 10 MIN: CPT | Performed by: FAMILY MEDICINE

## 2020-11-10 ENCOUNTER — OFFICE VISIT (OUTPATIENT)
Dept: FAMILY MEDICINE CLINIC | Facility: CLINIC | Age: 60
End: 2020-11-10

## 2020-11-10 VITALS
TEMPERATURE: 97.6 F | SYSTOLIC BLOOD PRESSURE: 108 MMHG | OXYGEN SATURATION: 98 % | HEART RATE: 57 BPM | WEIGHT: 149.6 LBS | BODY MASS INDEX: 26.51 KG/M2 | DIASTOLIC BLOOD PRESSURE: 64 MMHG | RESPIRATION RATE: 16 BRPM | HEIGHT: 63 IN

## 2020-11-10 DIAGNOSIS — N63.10 PAINFUL LUMPY RIGHT BREAST: Primary | ICD-10-CM

## 2020-11-10 DIAGNOSIS — N64.4 PAINFUL LUMPY RIGHT BREAST: Primary | ICD-10-CM

## 2020-11-10 PROCEDURE — 99214 OFFICE O/P EST MOD 30 MIN: CPT | Performed by: NURSE PRACTITIONER

## 2020-11-17 ENCOUNTER — OFFICE VISIT (OUTPATIENT)
Dept: FAMILY MEDICINE CLINIC | Facility: CLINIC | Age: 60
End: 2020-11-17

## 2020-11-17 VITALS
HEART RATE: 56 BPM | WEIGHT: 150 LBS | SYSTOLIC BLOOD PRESSURE: 128 MMHG | DIASTOLIC BLOOD PRESSURE: 72 MMHG | OXYGEN SATURATION: 98 % | BODY MASS INDEX: 27.6 KG/M2 | HEIGHT: 62 IN | RESPIRATION RATE: 16 BRPM | TEMPERATURE: 97.6 F

## 2020-11-17 DIAGNOSIS — E55.9 VITAMIN D DEFICIENCY: ICD-10-CM

## 2020-11-17 DIAGNOSIS — E78.2 MIXED HYPERLIPIDEMIA: ICD-10-CM

## 2020-11-17 DIAGNOSIS — Z00.00 WELL ADULT EXAM: Primary | ICD-10-CM

## 2020-11-17 DIAGNOSIS — Z28.21 REFUSED INFLUENZA VACCINE: ICD-10-CM

## 2020-11-17 DIAGNOSIS — Z83.3 FAMILY HISTORY OF DIABETES MELLITUS: ICD-10-CM

## 2020-11-17 DIAGNOSIS — R53.83 OTHER FATIGUE: ICD-10-CM

## 2020-11-17 PROCEDURE — 99396 PREV VISIT EST AGE 40-64: CPT | Performed by: NURSE PRACTITIONER

## 2020-11-17 RX ORDER — MELATONIN
5000 DAILY
COMMUNITY
End: 2021-09-24 | Stop reason: ALTCHOICE

## 2020-11-19 LAB — HBA1C MFR BLD HPLC: 5.3 %

## 2020-11-23 ENCOUNTER — TELEPHONE (OUTPATIENT)
Dept: FAMILY MEDICINE CLINIC | Facility: CLINIC | Age: 60
End: 2020-11-23

## 2020-11-30 ENCOUNTER — TELEPHONE (OUTPATIENT)
Dept: FAMILY MEDICINE CLINIC | Facility: CLINIC | Age: 60
End: 2020-11-30

## 2020-12-02 ENCOUNTER — TELEPHONE (OUTPATIENT)
Dept: FAMILY MEDICINE CLINIC | Facility: CLINIC | Age: 60
End: 2020-12-02

## 2020-12-02 ENCOUNTER — TREATMENT (OUTPATIENT)
Dept: FAMILY MEDICINE CLINIC | Facility: CLINIC | Age: 60
End: 2020-12-02

## 2020-12-02 DIAGNOSIS — M54.30 SCIATIC LEG PAIN: Primary | ICD-10-CM

## 2020-12-02 RX ORDER — MELOXICAM 15 MG/1
TABLET ORAL
Qty: 10 TABLET | Refills: 1 | Status: SHIPPED | OUTPATIENT
Start: 2020-12-02 | End: 2020-12-18 | Stop reason: SDUPTHER

## 2020-12-18 ENCOUNTER — TELEPHONE (OUTPATIENT)
Dept: FAMILY MEDICINE CLINIC | Facility: CLINIC | Age: 60
End: 2020-12-18

## 2020-12-18 DIAGNOSIS — M54.30 SCIATIC LEG PAIN: ICD-10-CM

## 2020-12-18 RX ORDER — MELOXICAM 15 MG/1
TABLET ORAL
Qty: 10 TABLET | Refills: 1 | Status: SHIPPED | OUTPATIENT
Start: 2020-12-18 | End: 2021-01-13 | Stop reason: SDUPTHER

## 2021-01-11 ENCOUNTER — TELEMEDICINE (OUTPATIENT)
Dept: FAMILY MEDICINE CLINIC | Facility: CLINIC | Age: 61
End: 2021-01-11

## 2021-01-11 DIAGNOSIS — R05.9 COUGH: Primary | ICD-10-CM

## 2021-01-11 PROCEDURE — 99213 OFFICE O/P EST LOW 20 MIN: CPT | Performed by: FAMILY MEDICINE

## 2021-01-11 NOTE — PROGRESS NOTES
COVID-19 Virtual Visit     Assessment/Plan:    Patient received COVID test yesterday  She has had symptoms for 8 days  Nothing significant in the way of lower respiratory symptoms  She will get back to me with the report  In the meantime symptomatic treatment  She is beyond the time where [facility]  is recommended  Problem List Items Addressed This Visit     None         Disposition:     I recommended self-quarantine for 14 days and to call back for worsening symptoms or development of shortness of breath  I have spent 8 minutes directly with the patient  Encounter provider Chava Santizo MD    Provider located at [address] [place] [phone]    Recent Visits  No visits were found meeting these conditions  Showing recent visits within past 7 days and meeting all other requirements     Today's Visits  Date Type Provider Dept   01/11/21 Telemedicine Chava Santizo MD [facility] today's visits and meeting all other requirements     Future Appointments  No visits were found meeting these conditions  Showing future appointments within next 150 days and meeting all other requirements        Patient agrees to participate in a virtual check in via telephone or video visit instead of presenting to the office to address urgent/immediate medical needs  Patient is aware this is a billable service  After connecting through [facility], the patient was identified by name and date of birth  Nancyanahy Jinfrancisco was informed that this was a telemedicine visit and that the exam was being conducted confidentially over secure lines  My office door was closed  No one else was in the room  Duc Badillo acknowledged consent and understanding of privacy and security of the telemedicine visit   I informed the patient that I have reviewed her record in Epic and presented the opportunity for her to ask any questions regarding the visit today  The patient agreed to participate  Subjective: Hong Zarco is a 56 y o  female who is concerned about COVID-19  Patient's symptoms include fever, nasal congestion, anosmia, loss of taste, cough and myalgias  Patient denies shortness of breath  Rhinorrhea: 8 days         Exposure:     Hospitalized recently for fever and/or lower respiratory symptoms?: No      Currently a healthcare worker that is involved in direct patient care?: No      Works in a special setting where the risk of COVID-19 transmission may be high? (this may include long-term care, correctional and jail facilities; homeless shelters; assisted-living facilities and group homes ): No      Resident in a special setting where the risk of COVID-19 transmission may be high? (this may include long-term care, correctional and jail facilities; homeless shelters; assisted-living facilities and group homes ): No      Tested yesterday at drug store     Sx for 8 days     No results found for: [address] [facility] [address], [address] [facility], [address] [facility]  [address] [address]  Past Medical History:   Diagnosis Date    Abnormal Pap smear of cervix     Colon polyp     Glaucoma     Heart palpitations     has port to monitor; none since 2009    Hyperlipemia     Mixed anxiety and depressive disorder 9/16/2015    MVP (mitral valve prolapse) 1985     Past Surgical History:   Procedure Laterality Date    AUGMENTATION MAMMAPLASTY  2000    BREAST IMPLANT Bilateral     CARDIAC CATHETERIZATION  2009    normal    [facility]      [facility]    KNEE DISLOCATION SURGERY Right    [address]    with LSO; ? 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 -- patient unclear with history    TUBAL LIGATION      WRIST SURGERY Left     states there are screws but not metal   Can go through a metal detector     Current Outpatient Medications   Medication Sig Dispense Refill    brimonidine tartrate 0 2 % ophthalmic solution INSTILL ONE DROP INTO BOTH EYES TWICE A DAY      brimonidine-timolol (COMBIGAN) 0 2-0 5 % 1 drop every 12 (twelve) hours      cholecalciferol (VITAMIN D3) 1,000 units tablet Take 1,000 Units by mouth daily      cycloSPORINE (RESTASIS MULTIDOSE) 0 05 % ophthalmic emulsion Administer to both eyes every 12 (twelve) hours       latanoprost (XALATAN) 0 005 % ophthalmic solution INSTILL 1 DROP INTO BOTH EYES AT BEDTIME      meloxicam (MOBIC) 15 mg tablet One daily for 10 days for hip pain 10 tablet 1    timolol (TIMOPTIC) 0 5 % ophthalmic solution INSTILL 1 DROP INTO BOTH EYES TWICE A DAY      TIMOLOL MALEATE OP 1 gtt BID OD       No current facility-administered medications for this visit  Allergies   Allergen Reactions    Iodine Hives and Swelling     12/15/10    Other reaction(s): ANAPHYLACTIC SHOCK    Brimonidine Tartrate     Nuts Itching     Any kind of nuts    Shellfish Allergy     Shellfish-Derived Products Hives and Swelling    Brimonidine Itching and Palpitations       Review of Systems   Constitutional: Positive for fever  HENT: Positive for congestion  Rhinorrhea: 8 days   Respiratory: Positive for cough  Negative for shortness of breath  Musculoskeletal: Positive for myalgias  Objective: There were no vitals filed for this visit  Physical Exam  VIRTUAL VISIT DISCLAIMER    Yudith Awad acknowledges that she has consented to an online visit or consultation  She understands that the online visit is based solely on information provided by her, and that, in the absence of a face-to-face physical evaluation by the physician, the diagnosis she receives is both limited and provisional in terms of accuracy and completeness  This is not intended to replace a full medical face-to-face evaluation by the physician  Yudith Awad understands and accepts these terms

## 2021-01-12 ENCOUNTER — TELEPHONE (OUTPATIENT)
Dept: FAMILY MEDICINE CLINIC | Facility: CLINIC | Age: 61
End: 2021-01-12

## 2021-01-12 NOTE — TELEPHONE ENCOUNTER
Patient is calling sting she has some chest pressure, more under both breast  She would like to know if she should be concern? Or what can she do about it?  Please advise

## 2021-01-12 NOTE — TELEPHONE ENCOUNTER
So long as her breathing is stable some element of chest pain particularly on deep breathing and while coughing is common with COVID    If the breathing remains stable nothing more needs to be done if her breathing is an issue I need to know that

## 2021-01-13 ENCOUNTER — TREATMENT (OUTPATIENT)
Dept: FAMILY MEDICINE CLINIC | Facility: CLINIC | Age: 61
End: 2021-01-13

## 2021-01-13 ENCOUNTER — TELEPHONE (OUTPATIENT)
Dept: FAMILY MEDICINE CLINIC | Facility: CLINIC | Age: 61
End: 2021-01-13

## 2021-01-13 DIAGNOSIS — M54.30 SCIATIC LEG PAIN: ICD-10-CM

## 2021-01-13 PROBLEM — U07.1 COVID-19: Chronic | Status: ACTIVE | Noted: 2021-01-13

## 2021-01-13 PROCEDURE — 99213 OFFICE O/P EST LOW 20 MIN: CPT | Performed by: FAMILY MEDICINE

## 2021-01-13 RX ORDER — MELOXICAM 15 MG/1
TABLET ORAL
Qty: 10 TABLET | Refills: 1 | Status: SHIPPED | OUTPATIENT
Start: 2021-01-13 | End: 2021-04-23 | Stop reason: ALTCHOICE

## 2021-01-13 NOTE — TELEPHONE ENCOUNTER
Patient called to let you know she is covid positive and she is having some symptoms  She also has a couple of questions   Please advise

## 2021-01-13 NOTE — PROGRESS NOTES
COVID-19 Virtual Visit     Assessment/Plan:    Problem List Items Addressed This Visit     None         Disposition:     I have spent 15 minutes directly with the patient  COVID + : nO LOWER RESP SX ; some chest pressure that is positional   O2 sats 95 at home   No fever   Encounter provider Sonia Bernard MD    Provider located at [address] [place] [phone]    Recent Visits  Date Type Provider Dept   01/12/21 Telephone Cindy Etienne, [address]   01/11/21 Telemedicine Sonia Bernard  [address] recent visits within past 7 days and meeting all other requirements     Today's Visits  Date Type Provider Dept   01/13/21 Telephone Cindy Etienne, [address] [facility] today's visits and meeting all other requirements     Future Appointments  No visits were found meeting these conditions  Showing future appointments within next 150 days and meeting all other requirements        Patient agrees to participate in a virtual check in via telephone or video visit instead of presenting to the office to address urgent/immediate medical needs  Patient is aware this is a billable service  After connecting through Telephone, the patient was identified by name and date of birth  Lia Bennett was informed that this was a telemedicine visit and that the exam was being conducted confidentially over secure lines  Lia Bennett acknowledged consent and understanding of privacy and security of the telemedicine visit  I informed the patient that I have reviewed her record in Epic and presented the opportunity for her to ask any questions regarding the visit today  The patient agreed to participate      It was my intent to perform this visit via video technology but the patient was not able to do a video connection so the visit was completed via audio telephone only  Subjective: Duc Badillo is a 56 y o  female who has been screened for COVID-19  Patient's symptoms include cough, chest tightness and nausea  Patient denies shortness of breath  Eduarda has been staying home and has isolated themselves in her home  She is taking care to not share personal items and is cleaning all surfaces that are touched often, like counters, tabletops, and doorknobs using household cleaning sprays or wipes  She is wearing a mask when she leaves her room       No results found for: Irma Huffman, [address] [facility], [address] [facility], [place] [address]  Past Medical History:   Diagnosis Date    Abnormal Pap smear of cervix     Colon polyp     Glaucoma     Heart palpitations     has port to monitor; none since 2009    Hyperlipemia     Mixed anxiety and depressive disorder 9/16/2015    MVP (mitral valve prolapse) 1985     Past Surgical History:   Procedure Laterality Date    AUGMENTATION MAMMAPLASTY  2000    BREAST IMPLANT Bilateral     CARDIAC CATHETERIZATION  2009    normal    CARDIAC LOOP RECORDER      CHOLECYSTECTOMY  1995    COLONOSCOPY      HERNIA REPAIR      umbilical    KNEE DISLOCATION SURGERY Right     TOTAL ABDOMINAL HYSTERECTOMY  1999    with LSO; ? 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 -- patient unclear with history    TUBAL LIGATION      WRIST SURGERY Left     states there are screws but not metal   Can go through a metal detector     Current Outpatient Medications   Medication Sig Dispense Refill    brimonidine tartrate 0 2 % ophthalmic solution INSTILL ONE DROP INTO BOTH EYES TWICE A DAY      brimonidine-timolol (COMBIGAN) 0 2-0 5 % 1 drop every 12 (twelve) hours      cholecalciferol (VITAMIN D3) 1,000 units tablet Take 1,000 Units by mouth daily      cycloSPORINE (RESTASIS MULTIDOSE) 0 05 % ophthalmic emulsion Administer to both eyes every 12 (twelve) hours       latanoprost (XALATAN) 0 005 % ophthalmic solution INSTILL 1 DROP INTO BOTH EYES AT BEDTIME  meloxicam (MOBIC) 15 mg tablet One daily for 10 days for hip pain 10 tablet 1    timolol (TIMOPTIC) 0 5 % ophthalmic solution INSTILL 1 DROP INTO BOTH EYES TWICE A DAY      TIMOLOL MALEATE OP 1 gtt BID OD       No current facility-administered medications for this visit  Allergies   Allergen Reactions    Iodine Hives and Swelling     12/15/10    Other reaction(s): ANAPHYLACTIC SHOCK    Brimonidine Tartrate     Nuts Itching     Any kind of nuts    Shellfish Allergy     Shellfish-Derived Products Hives and Swelling    Brimonidine Itching and Palpitations       Review of Systems   Respiratory: Positive for cough and chest tightness  Negative for shortness of breath  Gastrointestinal: Positive for nausea  Objective: There were no vitals filed for this visit  Physical Exam  Constitutional:       General: She is not in acute distress  Appearance: She is well-developed  Pulmonary:      Effort: Pulmonary effort is normal  No respiratory distress  Neurological:      Mental Status: She is alert and oriented to person, place, and time  Psychiatric:         Behavior: Behavior normal          Thought Content: Thought content normal          Judgment: Judgment normal          Continue monitoring ; call if lower resp sx worsen ;  VIRTUAL VISIT DISCLAIMER    Arch Kocher acknowledges that she has consented to an online visit or consultation  She understands that the online visit is based solely on information provided by her, and that, in the absence of a face-to-face physical evaluation by the physician, the diagnosis she receives is both limited and provisional in terms of accuracy and completeness  This is not intended to replace a full medical face-to-face evaluation by the physician  Arch Kocher understands and accepts these terms

## 2021-01-14 ENCOUNTER — TELEPHONE (OUTPATIENT)
Dept: FAMILY MEDICINE CLINIC | Facility: CLINIC | Age: 61
End: 2021-01-14

## 2021-01-14 NOTE — TELEPHONE ENCOUNTER
Patient called to let you know she is having some difficulty breathing  No pain but is hard to breath  She also states she has had an on and off fever of 101   Please advise

## 2021-01-15 ENCOUNTER — TELEPHONE (OUTPATIENT)
Dept: FAMILY MEDICINE CLINIC | Facility: CLINIC | Age: 61
End: 2021-01-15

## 2021-01-15 ENCOUNTER — OFFICE VISIT (OUTPATIENT)
Dept: URGENT CARE | Age: 61
End: 2021-01-15

## 2021-01-15 ENCOUNTER — TREATMENT (OUTPATIENT)
Dept: FAMILY MEDICINE CLINIC | Facility: CLINIC | Age: 61
End: 2021-01-15

## 2021-01-15 VITALS — OXYGEN SATURATION: 93 % | RESPIRATION RATE: 20 BRPM | TEMPERATURE: 98.3 F | HEART RATE: 98 BPM

## 2021-01-15 DIAGNOSIS — R00.0 TACHYCARDIA: ICD-10-CM

## 2021-01-15 DIAGNOSIS — U07.1 COVID-19: Primary | ICD-10-CM

## 2021-01-15 DIAGNOSIS — R06.00 DYSPNEA, UNSPECIFIED TYPE: Primary | ICD-10-CM

## 2021-01-15 DIAGNOSIS — U07.1 COVID-19: Primary | Chronic | ICD-10-CM

## 2021-01-15 PROCEDURE — NC001 PR NO CHARGE: Performed by: FAMILY MEDICINE

## 2021-01-15 PROCEDURE — G0382 LEV 3 HOSP TYPE B ED VISIT: HCPCS | Performed by: PHYSICIAN ASSISTANT

## 2021-01-15 NOTE — PROGRESS NOTES
[address]        NAME: Desi Bain is a 56 y o  female  : 1960    MRN: [id number]  DATE: January 15, 2021  TIME: 4:40 PM    Assessment and Plan   Dyspnea, unspecified type [R06 00]  1  Dyspnea, unspecified type  Transfer to other facility   2  Tachycardia  Transfer to other facility     SOB worsening since today, tachycardia, 2-3 word dyspnea  COVID +    Patient stood up from chair to walk to the exam table  Started getting tachycardic around 118-120  Asking to remove her mask as she was feeling very short of breath  Patient then requesting some oxygen- on 2 L nasal cannula oxygen increased to 98%    Patient Instructions     Patient requesting ambulance transfer to [facility] Emergency Department    Chief Complaint     Chief Complaint   Patient presents with    Shortness of Breath     pt c/o shortness of breath starting yesterday, dx with covid 21  Here for respiratory clinic         History of Present Illness       77-year-old female presents with her  for worsening shortness of breath since this morning but specifically the morning when she woke up  States she is also feeling very achy as having some back pain  Diagnosed with COVID on   Patient taking Tylenol with no relief   states he seems to have worsened throughout the day  Patient states he is having a hard time talking to his shortness of breath  States fevers have resolved  States overall very tired and fatigued      Review of Systems   Review of Systems   Constitutional: Positive for chills and fatigue  Negative for activity change, appetite change and fever  HENT: Negative for congestion, ear pain, facial swelling, postnasal drip, rhinorrhea, sinus pressure, sore throat, trouble swallowing and voice change  Eyes: Negative for discharge, itching and visual disturbance  Respiratory: Positive for cough and shortness of breath  Negative for chest tightness and wheezing  Cardiovascular: Negative for chest pain  Gastrointestinal: Negative for abdominal pain, diarrhea, nausea and vomiting  Musculoskeletal: Positive for myalgias  Negative for back pain and neck pain  Skin: Negative for rash  Neurological: Negative for dizziness, syncope, weakness, numbness and headaches  All other systems reviewed and are negative          Current Medications       Current Outpatient Medications:     brimonidine-timolol (COMBIGAN) 0 2-0 5 %, 1 drop every 12 (twelve) hours, Disp: , Rfl:     cholecalciferol (VITAMIN D3) 1,000 units tablet, Take 1,000 Units by mouth daily, Disp: , Rfl:     latanoprost (XALATAN) 0 005 % ophthalmic solution, INSTILL 1 DROP INTO BOTH EYES AT BEDTIME, Disp: , Rfl:     timolol (TIMOPTIC) 0 5 % ophthalmic solution, INSTILL 1 DROP INTO BOTH EYES TWICE A DAY, Disp: , Rfl:     brimonidine tartrate 0 2 % ophthalmic solution, INSTILL ONE DROP INTO BOTH EYES TWICE A DAY, Disp: , Rfl:     cycloSPORINE (RESTASIS MULTIDOSE) 0 05 % ophthalmic emulsion, Administer to both eyes every 12 (twelve) hours , Disp: , Rfl:     meloxicam (MOBIC) 15 mg tablet, One daily for 10 days for hip pain (Patient not taking: Reported on 1/15/2021), Disp: 10 tablet, Rfl: 1    TIMOLOL MALEATE OP, 1 gtt BID OD, Disp: , Rfl:     Current Allergies     Allergies as of 01/15/2021 - Reviewed 01/15/2021   Allergen Reaction Noted    Iodine Hives and Swelling 06/27/2009    Brimonidine tartrate  05/29/2019    Nuts Itching 12/20/2016    Shellfish allergy  05/29/2019    Shellfish-derived products Hives and Swelling 06/27/2009    Brimonidine Itching and Palpitations 03/10/2016            The following portions of the patient's history were reviewed and updated as appropriate: allergies, current medications, past family history, past medical history, past social history, past surgical history and problem list      Past Medical History:   Diagnosis Date    Abnormal Pap smear of cervix     Colon polyp     Glaucoma     Heart palpitations     has port to monitor; none since 2009    Hyperlipemia     Mixed anxiety and depressive disorder 9/16/2015    MVP (mitral valve prolapse) 1985       Past Surgical History:   Procedure Laterality Date    AUGMENTATION MAMMAPLASTY  2000    BREAST IMPLANT Bilateral     CARDIAC CATHETERIZATION  2009    normal    CARDIAC LOOP RECORDER      CHOLECYSTECTOMY  1995    COLONOSCOPY      HERNIA REPAIR      umbilical    KNEE DISLOCATION SURGERY Right     TOTAL ABDOMINAL HYSTERECTOMY  1999    with LSO; ? 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 -- patient unclear with history    TUBAL LIGATION      WRIST SURGERY Left     states there are screws but not metal   Can go through a metal detector       Family History   Problem Relation Age of Onset    Hypertension Mother     Breast cancer Mother 79    Osteoporosis Mother     Heart attack Father     Diabetes Sister     Diabetes Brother     Heart attack Paternal Grandfather     No Known Problems Maternal Grandmother     No Known Problems Maternal Grandfather     No Known Problems Paternal Grandmother     No Known Problems Maternal Aunt     No Known Problems Maternal Aunt     No Known Problems Paternal Aunt          Medications have been verified  Objective   Pulse 98   Temp 98 3 °F (36 8 °C)   Resp 20   SpO2 93% Comment: 93% ambulatory, 98% sitting       Physical Exam     Physical Exam  Vitals signs and nursing note reviewed  Constitutional:       Appearance: Normal appearance  She is well-developed  Comments: Patient appears tired, fatigued   Neck:      Musculoskeletal: Normal range of motion and neck supple  Cardiovascular:      Rate and Rhythm: Regular rhythm  Tachycardia present  Heart sounds: Normal heart sounds  Pulmonary:      Effort: Pulmonary effort is normal  No respiratory distress  Breath sounds: Normal breath sounds  No wheezing        Comments: About 2-3 word dyspnea, appears short of breath  Skin:     Capillary Refill: Capillary refill takes less than 2 seconds  Neurological:      Mental Status: She is alert and oriented to person, place, and time     Psychiatric:         Behavior: Behavior normal

## 2021-01-15 NOTE — PROGRESS NOTES
Pt became acutely short of breath when provider was examining her, placed on 3 liters oxygen via nasal cannula  Pt states feel less short of breath after oxygen applied, pulse ox 98-99% on O2  911 called to transport patient to the ER    at bedside

## 2021-01-15 NOTE — PROGRESS NOTES
COVID-19 Virtual Visit      patient calls because she is having increasing respiratory difficulty  Just doing routine things around the house has made her short of breath during the last day no fever she describes it even as we are discussing this on the phone she seems breathless  She does have a pulse oximeter at home which continues to show that she is in the 90s but I am concerned that perhaps that machine is not working properly  At any rate I have recommended that she be evaluated at 1 of our respiratory clinics  Assessment/Plan:    Problem List Items Addressed This Visit     None         COVID-19 Plan     Encounter provider Keshav Kathleen MD    Provider located at [address] [place] [phone]    Recent Visits  Date Type Provider Dept   01/14/21 Telephone Joni Finical, [address]   01/13/21 Telephone Joni Finical, [address]   01/12/21 Telephone Jnoi Finical, [address]   01/11/21 Telemedicine Keshav Kathleen  [address] recent visits within past 7 days and meeting all other requirements     Today's Visits  Date Type Provider Dept   01/15/21 Telephone Alysa Fernandez MA P[address]    Showing today's visits and meeting all other requirements     Future Appointments  No visits were found meeting these conditions     Showing future appointments within next 150 days and meeting all other requirements      COVID-19 HPI  No results found for: [address] [facility], [address] [facility], [address] [facility],[address], [place] [address]  Past Medical History:   Diagnosis Date    Abnormal Pap smear of cervix     Colon polyp     Glaucoma     Heart palpitations     has port to monitor; none since 2009    Hyperlipemia     Mixed anxiety and depressive disorder 9/16/2015    MVP (mitral valve prolapse) 1985     Past Surgical History:   Procedure Laterality Date  AUGMENTATION MAMMAPLASTY  2000    BREAST IMPLANT Bilateral     CARDIAC CATHETERIZATION  2009    normal    CARDIAC LOOP RECORDER      CHOLECYSTECTOMY  1995    COLONOSCOPY      HERNIA REPAIR      umbilical    KNEE DISLOCATION SURGERY Right     TOTAL ABDOMINAL HYSTERECTOMY  1999    with LSO; ? 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 -- patient unclear with history    TUBAL LIGATION      WRIST SURGERY Left     states there are screws but not metal   Can go through a metal detector     Current Outpatient Medications   Medication Sig Dispense Refill    brimonidine tartrate 0 2 % ophthalmic solution INSTILL ONE DROP INTO BOTH EYES TWICE A DAY      brimonidine-timolol (COMBIGAN) 0 2-0 5 % 1 drop every 12 (twelve) hours      cholecalciferol (VITAMIN D3) 1,000 units tablet Take 1,000 Units by mouth daily      cycloSPORINE (RESTASIS MULTIDOSE) 0 05 % ophthalmic emulsion Administer to both eyes every 12 (twelve) hours       latanoprost (XALATAN) 0 005 % ophthalmic solution INSTILL 1 DROP INTO BOTH EYES AT BEDTIME      meloxicam (MOBIC) 15 mg tablet One daily for 10 days for hip pain 10 tablet 1    timolol (TIMOPTIC) 0 5 % ophthalmic solution INSTILL 1 DROP INTO BOTH EYES TWICE A DAY      TIMOLOL MALEATE OP 1 gtt BID OD       No current facility-administered medications for this visit  Allergies   Allergen Reactions    Iodine Hives and Swelling     12/15/10    Other reaction(s): ANAPHYLACTIC SHOCK    Brimonidine Tartrate     Nuts Itching     Any kind of nuts    Shellfish Allergy     Shellfish-Derived Products Hives and Swelling    Brimonidine Itching and Palpitations       Review of Systems  Objective: There were no vitals filed for this visit  Physical Exam  VIRTUAL VISIT DISCLAIMER    Makayla Delmy acknowledges that she has consented to an online visit or consultation   She understands that the online visit is based solely on information provided by her, and that, in the absence of a face-to-face physical evaluation by the physician, the diagnosis she receives is both limited and provisional in terms of accuracy and completeness  This is not intended to replace a full medical face-to-face evaluation by the physician  Makayla Brock understands and accepts these terms

## 2021-01-15 NOTE — TELEPHONE ENCOUNTER
Patient notified  Scheduled for respiratory clinic [address] [place] [address]  Today at 3 pm

## 2021-01-15 NOTE — PROGRESS NOTES
COVID-19 Virtual Visit     Assessment/Plan:    Problem List Items Addressed This Visit     None         COVID-19 Plan     Encounter provider Yeimy Bennett MD    Provider located at [address] [place] [phone]    Recent Visits  Date Type Provider Dept   01/14/21 Telephone Thurnell Sep, [address]   01/13/21 Telephone Thurnell Sep, [address]   01/12/21 Telephone Thurnell Sep, [address]   01/11/21 Telemedicine Yeimy Bennett  [address] recent visits within past 7 days and meeting all other requirements     Today's Visits  Date Type Provider Dept   01/15/21 Telephone Alysa Fernandez MA [address] [facility] [address]    Showing today's visits and meeting all other requirements     Future Appointments  No visits were found meeting these conditions     Showing future appointments within next 150 days and meeting all other requirements      COVID-19 HPI  No results found for: Constantino Scherer, [address] [facility], [address] [facility] [place] [address]  Past Medical History:   Diagnosis Date    Abnormal Pap smear of cervix     Colon polyp     Glaucoma     Heart palpitations     has port to monitor; none since 2009    Hyperlipemia     Mixed anxiety and depressive disorder 9/16/2015    MVP (mitral valve prolapse) 1985     Past Surgical History:   Procedure Laterality Date    AUGMENTATION MAMMAPLASTY  2000    BREAST IMPLANT Bilateral     CARDIAC CATHETERIZATION  2009    normal    CARDIAC LOOP RECORDER      CHOLECYSTECTOMY  1995    COLONOSCOPY      HERNIA REPAIR      umbilical    KNEE DISLOCATION SURGERY Right     TOTAL ABDOMINAL HYSTERECTOMY  1999    with LSO; ? 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 -- patient unclear with history    TUBAL LIGATION      WRIST SURGERY Left     states there are screws but not metal   Can go through a metal detector     Current Outpatient Medications   Medication Sig Dispense Refill    brimonidine tartrate 0 2 % ophthalmic solution INSTILL ONE DROP INTO BOTH EYES TWICE A DAY      brimonidine-timolol (COMBIGAN) 0 2-0 5 % 1 drop every 12 (twelve) hours      cholecalciferol (VITAMIN D3) 1,000 units tablet Take 1,000 Units by mouth daily      cycloSPORINE (RESTASIS MULTIDOSE) 0 05 % ophthalmic emulsion Administer to both eyes every 12 (twelve) hours       latanoprost (XALATAN) 0 005 % ophthalmic solution INSTILL 1 DROP INTO BOTH EYES AT BEDTIME      meloxicam (MOBIC) 15 mg tablet One daily for 10 days for hip pain 10 tablet 1    timolol (TIMOPTIC) 0 5 % ophthalmic solution INSTILL 1 DROP INTO BOTH EYES TWICE A DAY      TIMOLOL MALEATE OP 1 gtt BID OD       No current facility-administered medications for this visit  Allergies   Allergen Reactions    Iodine Hives and Swelling     12/15/10    Other reaction(s): ANAPHYLACTIC SHOCK    Brimonidine Tartrate     Nuts Itching     Any kind of nuts    Shellfish Allergy     Shellfish-Derived Products Hives and Swelling    Brimonidine Itching and Palpitations       Review of Systems  Objective: There were no vitals filed for this visit  Physical Exam  VIRTUAL VISIT DISCLAIMER    Clara Delgado acknowledges that she has consented to an online visit or consultation  She understands that the online visit is based solely on information provided by her, and that, in the absence of a face-to-face physical evaluation by the physician, the diagnosis she receives is both limited and provisional in terms of accuracy and completeness  This is not intended to replace a full medical face-to-face evaluation by the physician  Clara Delgado understands and accepts these terms

## 2021-01-15 NOTE — TELEPHONE ENCOUNTER
Call and find out what her oxygen levels are  If they remain above 93 nothing  Serious    If they have dropped below that consistently let me know low-grade fever can continue for several weeks in these cases

## 2021-01-15 NOTE — TELEPHONE ENCOUNTER
Please call  Left message yesterday was NOT called back  Not doing well  Cousin (infectious disease specialist) recommended zithromax 5mg for 3 days  Prednisone 20mg  Bid for 3 days   Then 20mg once a day for 2 days then 10mg once a day for 2 days total of 7 days    Can't walk  Feet swelling,  hands sweating   telephone  [phone]     O2  95%  Was positive COVID  1/9/2021

## 2021-01-15 NOTE — TELEPHONE ENCOUNTER
COVID-19 Virtual Visit     Assessment/Plan:    Problem List Items Addressed This Visit        Other    COVID-19 - Primary (Chronic)         [unfilled]     Encounter provider Alysa Fernandez MA    Provider located at [address] [address]    Recent Visits  Date Type Provider Dept   01/14/21 Telephone Roxanna Orozco, [address]   01/13/21 Telephone Roxanna Orozco, [address]   01/12/21 Telephone Roxanna Orozco, [address]   01/11/21 Telemedicine Devin Sanchez MD [address] [facility]   Showing recent visits within past 7 days and meeting all other requirements     Today's Visits  Date Type Provider Dept   01/15/21 Telephone Alysa Killian MA [address]    01/15/21 Telephone Alysa Killian MA [address]    Showing today's visits and meeting all other requirements     Future Appointments  No visits were found meeting these conditions     Showing future appointments within next 150 days and meeting all other requirements      [unfilled]  No results found for: [address] [facility], [address] [facility], [address] [facility],[address], [place] [address]  Past Medical History:   Diagnosis Date    Abnormal Pap smear of cervix     Colon polyp     Glaucoma     Heart palpitations     has port to monitor; none since 2009    Hyperlipemia     Mixed anxiety and depressive disorder 9/16/2015    MVP (mitral valve prolapse) 1985     Past Surgical History:   Procedure Laterality Date    AUGMENTATION MAMMAPLASTY  2000    BREAST IMPLANT Bilateral     CARDIAC CATHETERIZATION  2009    normal    CARDIAC LOOP RECORDER      CHOLECYSTECTOMY  1995    COLONOSCOPY      HERNIA REPAIR      umbilical    KNEE DISLOCATION SURGERY Right     TOTAL ABDOMINAL HYSTERECTOMY  1999    with LSO; ? 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 -- patient unclear with history    TUBAL LIGATION      WRIST SURGERY Left     states there are screws but not metal   Can go through a metal detector     Current Outpatient Medications   Medication Sig Dispense Refill    brimonidine tartrate 0 2 % ophthalmic solution INSTILL ONE DROP INTO BOTH EYES TWICE A DAY      brimonidine-timolol (COMBIGAN) 0 2-0 5 % 1 drop every 12 (twelve) hours      cholecalciferol (VITAMIN D3) 1,000 units tablet Take 1,000 Units by mouth daily      cycloSPORINE (RESTASIS MULTIDOSE) 0 05 % ophthalmic emulsion Administer to both eyes every 12 (twelve) hours       latanoprost (XALATAN) 0 005 % ophthalmic solution INSTILL 1 DROP INTO BOTH EYES AT BEDTIME      meloxicam (MOBIC) 15 mg tablet One daily for 10 days for hip pain 10 tablet 1    timolol (TIMOPTIC) 0 5 % ophthalmic solution INSTILL 1 DROP INTO BOTH EYES TWICE A DAY      TIMOLOL MALEATE OP 1 gtt BID OD       No current facility-administered medications for this visit  Allergies   Allergen Reactions    Iodine Hives and Swelling     12/15/10    Other reaction(s): ANAPHYLACTIC SHOCK    Brimonidine Tartrate     Nuts Itching     Any kind of nuts    Shellfish Allergy     Shellfish-Derived Products Hives and Swelling    Brimonidine Itching and Palpitations       [unfilled]  Objective:    [unfilled]    [unfilled]  VIRTUAL VISIT DISCLAIMER    Juan Jose Wheatley acknowledges that she has consented to an online visit or consultation  She understands that the online visit is based solely on information provided by her, and that, in the absence of a face-to-face physical evaluation by the physician, the diagnosis she receives is both limited and provisional in terms of accuracy and completeness  This is not intended to replace a full medical face-to-face evaluation by the physician  Juan Jose Wheatley understands and accepts these terms

## 2021-01-17 ENCOUNTER — TREATMENT (OUTPATIENT)
Dept: FAMILY MEDICINE CLINIC | Facility: CLINIC | Age: 61
End: 2021-01-17

## 2021-01-17 DIAGNOSIS — U07.1 COVID-19: Primary | Chronic | ICD-10-CM

## 2021-01-17 PROCEDURE — 99213 OFFICE O/P EST LOW 20 MIN: CPT | Performed by: FAMILY MEDICINE

## 2021-01-17 NOTE — PROGRESS NOTES
COVID-19 Virtual Visit     Stable; O2 stable  ; no increased resp sx  Assessment/Plan:    Problem List Items Addressed This Visit     None         COVID-19 Plan       No changes ; RTW when able   Encounter provider Hardik Mcintosh MD    Provider located at [address] [address]    Recent Visits  Date Type Provider Dept   01/15/21 Telephone Alysa Ludlow, [address]   01/15/21 Telephone Alysa Dunawayo, [place] [address]    01/14/21 Telephone Earney Smoke, [address]   01/13/21 Telephone Earney Smoke, [address]   01/12/21 Telephone Earney Smoke, [address]   01/11/21 Telemedicine Hardik Mcintosh  [address] recent visits within past 7 days and meeting all other requirements     Future Appointments  No visits were found meeting these conditions     Showing future appointments within next 150 days and meeting all other requirements      COVID-19 HPI  No results found for: Magalie Pineda, [address] [facility], [address] [place] [address],[address] [place] [address]  Past Medical History:   Diagnosis Date    Abnormal Pap smear of cervix     Colon polyp     Glaucoma     Heart palpitations     has port to monitor; none since 2009    Hyperlipemia     Mixed anxiety and depressive disorder 9/16/2015    MVP (mitral valve prolapse) 1985     Past Surgical History:   Procedure Laterality Date    AUGMENTATION MAMMAPLASTY  2000    BREAST IMPLANT Bilateral     CARDIAC CATHETERIZATION  2009    normal    CARDIAC LOOP RECORDER      CHOLECYSTECTOMY  1995    COLONOSCOPY      HERNIA REPAIR      umbilical    KNEE DISLOCATION SURGERY Right     TOTAL ABDOMINAL HYSTERECTOMY  1999    with LSO; ? 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 -- patient unclear with history    TUBAL LIGATION      WRIST SURGERY Left     states there are screws but not metal   Can go through a metal detector     Current Outpatient Medications   Medication Sig Dispense Refill    brimonidine tartrate 0 2 % ophthalmic solution INSTILL ONE DROP INTO BOTH EYES TWICE A DAY      brimonidine-timolol (COMBIGAN) 0 2-0 5 % 1 drop every 12 (twelve) hours      cholecalciferol (VITAMIN D3) 1,000 units tablet Take 1,000 Units by mouth daily      cycloSPORINE (RESTASIS MULTIDOSE) 0 05 % ophthalmic emulsion Administer to both eyes every 12 (twelve) hours       latanoprost (XALATAN) 0 005 % ophthalmic solution INSTILL 1 DROP INTO BOTH EYES AT BEDTIME      meloxicam (MOBIC) 15 mg tablet One daily for 10 days for hip pain (Patient not taking: Reported on 1/15/2021) 10 tablet 1    timolol (TIMOPTIC) 0 5 % ophthalmic solution INSTILL 1 DROP INTO BOTH EYES TWICE A DAY      TIMOLOL MALEATE OP 1 gtt BID OD       No current facility-administered medications for this visit  Allergies   Allergen Reactions    Iodine Hives and Swelling     12/15/10    Other reaction(s): ANAPHYLACTIC SHOCK    Brimonidine Tartrate     Nuts Itching     Any kind of nuts    Shellfish Allergy     Shellfish-Derived Products Hives and Swelling    Brimonidine Itching and Palpitations       Review of Systems  Objective: There were no vitals filed for this visit  Physical Exam  VIRTUAL VISIT DISCLAIMER    Emigdio Sanchez acknowledges that she has consented to an online visit or consultation  She understands that the online visit is based solely on information provided by her, and that, in the absence of a face-to-face physical evaluation by the physician, the diagnosis she receives is both limited and provisional in terms of accuracy and completeness  This is not intended to replace a full medical face-to-face evaluation by the physician  Emigdio Sanchez understands and accepts these terms

## 2021-01-18 ENCOUNTER — TELEPHONE (OUTPATIENT)
Dept: FAMILY MEDICINE CLINIC | Facility: CLINIC | Age: 61
End: 2021-01-18

## 2021-01-18 NOTE — TELEPHONE ENCOUNTER
Oxygen level good, no fever but has rash on forehead  Itchy  X 3 days   Took benadryl  Disappears  Then returns  [phone]

## 2021-01-20 ENCOUNTER — TELEPHONE (OUTPATIENT)
Dept: FAMILY MEDICINE CLINIC | Facility: CLINIC | Age: 61
End: 2021-01-20

## 2021-01-20 ENCOUNTER — TREATMENT (OUTPATIENT)
Dept: FAMILY MEDICINE CLINIC | Facility: CLINIC | Age: 61
End: 2021-01-20

## 2021-01-20 NOTE — TELEPHONE ENCOUNTER
Patient requesting a call from Dr Sim Koyanagi  Patient is having a lot of chest pressure and SOB, states oxygen level is good but it's very uncomfortable  Please advise

## 2021-01-22 ENCOUNTER — TELEPHONE (OUTPATIENT)
Dept: FAMILY MEDICINE CLINIC | Facility: CLINIC | Age: 61
End: 2021-01-22

## 2021-01-22 NOTE — TELEPHONE ENCOUNTER
Upper right back pain since yesterday   positive covid   [phone]  Please advise   Patient states Dr Scarlet Katz told her to call with any new symptoms

## 2021-01-24 DIAGNOSIS — Z23 ENCOUNTER FOR IMMUNIZATION: ICD-10-CM

## 2021-01-26 ENCOUNTER — TELEPHONE (OUTPATIENT)
Dept: FAMILY MEDICINE CLINIC | Facility: CLINIC | Age: 61
End: 2021-01-26

## 2021-01-26 NOTE — TELEPHONE ENCOUNTER
patient states she needs a note from you stating she is not to go to work because she is still showing symptoms  The note must say very specific things per her employer, so could you call her to get those specifics    She is still coughing, nausea, headaches, body aches etc, etc, etc

## 2021-01-27 ENCOUNTER — TELEPHONE (OUTPATIENT)
Dept: FAMILY MEDICINE CLINIC | Facility: CLINIC | Age: 61
End: 2021-01-27

## 2021-01-27 ENCOUNTER — TREATMENT (OUTPATIENT)
Dept: FAMILY MEDICINE CLINIC | Facility: CLINIC | Age: 61
End: 2021-01-27

## 2021-01-27 NOTE — TELEPHONE ENCOUNTER
1)  Date condition started    1/8/2021      2)  Duration of condition-- she has covid      3)  approprate medical facts noted by the health care provider    Any treatment  Any restrictions to go back to work   Current symptoms   Coughing   SOB   Lowest 94% oxygen  No fever  Going for a ride today  Not that weak    Can't lift or do her duties   Planning to go back Monday to work if her health allows her to   Taking  Nucleotide complex (maltodextrin hypromellose hydroxytropyl )  Helped tremendously  Could not get her head off the bed   Stopped   Vitamin  D3   Should she start taking vitamin D  again ? Can she take both ?     Fax letter  To   [phone]  Mckenzie Jennings

## 2021-01-28 ENCOUNTER — TELEPHONE (OUTPATIENT)
Dept: FAMILY MEDICINE CLINIC | Facility: CLINIC | Age: 61
End: 2021-01-28

## 2021-01-28 NOTE — TELEPHONE ENCOUNTER
Patient called back and states that when she is able to have a bowel movement it is yellow in color and she is feeling a lot of pressure and pain when she urinates and feels like it may be inflamed in her "pelvic region"

## 2021-01-29 ENCOUNTER — TELEPHONE (OUTPATIENT)
Dept: FAMILY MEDICINE CLINIC | Facility: CLINIC | Age: 61
End: 2021-01-29

## 2021-01-29 NOTE — TELEPHONE ENCOUNTER
Patient called and states that the ER gave her ciprofloxacin when she was there at [facility] and is now having a racing heart beat  She was concerned that she has many of the things listed on the bottle that you should not take with this new med    Please advise

## 2021-01-29 NOTE — TELEPHONE ENCOUNTER
The fast heartbeat is likely from the infection from the diverticulitis and not the medicines  The antibiotics she was given are very common once and they do not to my knowledge cause fast heart rates    Make sure she is drinking plenty of fluids so that she is not under hydrated the symptoms should resolve themselves as the diverticulitis gets better

## 2021-02-04 ENCOUNTER — TELEPHONE (OUTPATIENT)
Dept: FAMILY MEDICINE CLINIC | Facility: CLINIC | Age: 61
End: 2021-02-04

## 2021-02-04 NOTE — TELEPHONE ENCOUNTER
I did not refer her to a specific doctor I referred true to the rheumatology pool    This will ensure that she gets the most timely service has most of the rheumatologists have schedule is booked out months in advance

## 2021-02-10 ENCOUNTER — OFFICE VISIT (OUTPATIENT)
Dept: FAMILY MEDICINE CLINIC | Facility: CLINIC | Age: 61
End: 2021-02-10
Payer: COMMERCIAL

## 2021-02-10 VITALS
DIASTOLIC BLOOD PRESSURE: 72 MMHG | OXYGEN SATURATION: 98 % | BODY MASS INDEX: 26.78 KG/M2 | WEIGHT: 145.5 LBS | TEMPERATURE: 97.5 F | HEIGHT: 62 IN | SYSTOLIC BLOOD PRESSURE: 112 MMHG | HEART RATE: 88 BPM

## 2021-02-10 DIAGNOSIS — E78.2 MIXED HYPERLIPIDEMIA: ICD-10-CM

## 2021-02-10 DIAGNOSIS — U07.1 COVID-19: Chronic | ICD-10-CM

## 2021-02-10 DIAGNOSIS — Z23 ENCOUNTER FOR IMMUNIZATION: Primary | ICD-10-CM

## 2021-02-10 DIAGNOSIS — R10.30 LOWER ABDOMINAL PAIN: Chronic | ICD-10-CM

## 2021-02-10 PROBLEM — J02.9 SORE THROAT: Status: RESOLVED | Noted: 2020-03-27 | Resolved: 2021-02-10

## 2021-02-10 PROBLEM — H92.01 RIGHT EAR PAIN: Status: RESOLVED | Noted: 2020-04-21 | Resolved: 2021-02-10

## 2021-02-10 PROCEDURE — 99495 TRANSJ CARE MGMT MOD F2F 14D: CPT | Performed by: FAMILY MEDICINE

## 2021-02-10 NOTE — PATIENT INSTRUCTIONS
I do not recommend returning to work until your blood pressure consistently is above 100  Try to hydrate well increase her salt intake and that should help

## 2021-02-10 NOTE — PROGRESS NOTES
Assessment/Plan:    No problem-specific Assessment & Plan notes found for this encounter  Diagnoses and all orders for this visit:    Encounter for immunization    Lower abdominal pain    Mixed hyperlipidemia    COVID-19    Other orders  -     Cancel: influenza vaccine, quadrivalent, recombinant, PF, 0 5 mL, for patients 18 yr+ (FLUBLOK)          Subjective:      Patient ID: Debbie Reich is a 56 y o  female  F/U Covid / diverticulitis/ sev hosp stays  Never had Covid pneumonia  Palpitations ; Had allergic reaction after starting flagyl/ Cipro combo : then augmentin : did Ok but got weak / low BP at end of treatment   Patient is still experiencing some considerable fatigue palpitations low energy and episodic hypotension since COVID  Reviewing the hospital records in detail fortunately we find no current physical issues to explain this  Home records : SBP < 100 many times lately   The following portions of the patient's history were reviewed and updated as appropriate: allergies, current medications, past family history, past medical history, past social history, past surgical history and problem list     Review of Systems   Constitutional: Positive for fatigue  Negative for activity change and appetite change  HENT: Negative for voice change  Eyes: Negative for visual disturbance  Respiratory: Negative for chest tightness and shortness of breath  Cardiovascular: Positive for palpitations  Negative for chest pain and leg swelling  Gastrointestinal: Negative for abdominal pain, blood in stool, constipation and diarrhea  Genitourinary: Negative for dysuria, vaginal bleeding and vaginal discharge  Skin: Negative for rash  Neurological: Positive for dizziness  Psychiatric/Behavioral: Negative for dysphoric mood           Objective:  Vitals:    02/10/21 0824   BP: 112/72   BP Location: Left arm   Patient Position: Sitting   Cuff Size: Standard   Pulse: 88   Temp: 97 5 °F (36 4 °C)   TempSrc: Temporal   SpO2: 98%   Weight: 66 kg (145 lb 8 oz)   Height: 5' 2" (1 575 m)      Physical Exam  Constitutional:       Appearance: She is well-developed  HENT:      Head: Normocephalic and atraumatic  Eyes:      Conjunctiva/sclera: Conjunctivae normal    Neck:      Musculoskeletal: Neck supple  Thyroid: No thyromegaly  Cardiovascular:      Rate and Rhythm: Normal rate and regular rhythm  Heart sounds: Normal heart sounds  No murmur  Pulmonary:      Effort: Pulmonary effort is normal  No respiratory distress  Breath sounds: Normal breath sounds  Lymphadenopathy:      Cervical: No cervical adenopathy  Skin:     General: Skin is warm and dry  Psychiatric:         Behavior: Behavior normal          Slow recovery from COVID     Not ready to return to work yet;     Still has monitor in place

## 2021-02-10 NOTE — LETTER
February 10, 2021     Patient: Nick Morgan   YOB: 1960   Date of Visit: 2/10/2021       To Whom it May Concern:    Nick Morgan is under my professional care  She was seen in my office on 2/10/2021  She may return to work on 2/17/21  If you have any questions or concerns, please don't hesitate to call           Sincerely,          Hardik Mcintosh MD        CC: No Recipients

## 2021-02-11 ENCOUNTER — TRANSCRIBE ORDERS (OUTPATIENT)
Dept: PAIN MEDICINE | Facility: CLINIC | Age: 61
End: 2021-02-11

## 2021-02-11 ENCOUNTER — TELEPHONE (OUTPATIENT)
Dept: PAIN MEDICINE | Facility: CLINIC | Age: 61
End: 2021-02-11

## 2021-02-11 NOTE — TELEPHONE ENCOUNTER
Left message for patient that we do not accept her insurance and she should call her ins  Company and see where she can go

## 2021-02-11 NOTE — PROGRESS NOTES
Assessment and Plan:     Patient is a 68-year-old female who presents for rheumatology consult regarding osteoporosis and also complaints of multiple sites of joint pain  We reviewed her osteoporosis is severe in the spine with a T-score -3 2  We discussed that I would not recommend just   Continuation of natural supplements for this and would recommend she go on medication  She is unable to take oral bisphosphonates due to chronic gastritis and she also reports a history of H pylori  Therefore we would have to use an IV bisphosphonate like Reclast   We discussed the risks and benefits  I did review  With her in detail about the rare risk of osteonecrosis of the jaw after use of bisphosphonates  She has no plans to get her dental work completed in the near future and would like to prioritize her osteoporosis treatment  We reviewed that it is possible in the future a  Dentist would hesitate to do her dental work after being treated with the Reclast for several months after the medication but she was okay with this  We also discussed the risk of the osteonecrosis in the jaw after her dental work and she again was okay with that since she does not plan to have it done any time in the near future  We will submit for prior authorization for the Reclast   Once that is approved we will arrange for an infusion for her and then she would be due annually  She is planning to moved to [place] in August of this year we discussed that if she does in fact moved by that time she can set up with a rheumatologist in [place] to get additional doses of the Reclast in the future when she is due again  In terms of her joint pain, she has no findings on exam today to suggest rheumatoid arthritis  We discussed this is likely osteoarthritis and she also seems to have amplified myofascial pain particularly at the bilateral hip bursa  I did give her bilateral hip bursa injections today without complication    I do not think she needs a repeat rheumatologic workup at this point since it was negative in 2019 and she again lacks typical features of an inflammatory arthritis  She did also want a referral to physical therapy for her hips, lower back and neck pain so I provided her with this today  Plan:  Diagnoses and all orders for this visit:    Post-menopausal osteoporosis    Greater trochanteric bursitis of right hip  -     triamcinolone acetonide (KENALOG-40) 40 mg/mL injection 40 mg  -     bupivacaine (PF) (MARCAINE) 0 25 % injection 2 mL  -     Ambulatory referral to Physical Therapy; Future    Greater trochanteric bursitis of left hip  -     triamcinolone acetonide (KENALOG-40) 40 mg/mL injection 40 mg  -     bupivacaine (PF) (MARCAINE) 0 25 % injection 2 mL  -     Ambulatory referral to Physical Therapy; Future    Chronic neck pain  -     Ambulatory referral to Physical Therapy; Future        Follow-up plan: 1 year       NIMESH  Hong Zarco is a 56 y o   female with h/o left wrist fracture s/p ORIF, hip OA, HLD,  Gastritis, history of H pylori, anxiety, galucoma, h/o TIA, h/o diverticulitis, h/o COVID-19 who presents for rheumatology consult by request of Dr Dominick Barton for osteoporosis  Patient has never been on treatment for osteoporosis  She is interested in pursuing "natural" treatments before medication  But is willing to discuss going on medication if I think it is necessary  She fell on her back with her left wrist behind her back and fractured her wrist requiring surgery ORIF in 2014  She describes a traumatic fracture with falling backwards on the pavement and landing on the wrist   This has been her only fracture  States she has multiple family members with osteoporosis including her mother  No history of chronic systemic steroid use  States she has not had dental care over most of her life   She had a very traumatizing experience at the dentist at age 4, then saw dentist at age 13  And then once more as an adult about 4 years ago  States she was told she does need some dental work done with extraction but she has no plans to complete this in the near future  She does take vitamin-D 5000 units daily  She does have issues with chronic gastritis and would like to   Avoid any oral medications  She describes multiple sites of joint pain  She has B/L lateral hip pain which is worsened by lying on each hip at night  she also has chronic lower back and neck pain  She also has right knee pain and was told in the past she has arthritis in her knee  She did have rheumatologic lab work done in 2019 that we reviewed today and it was grossly negative  Review of Systems  Review of Systems   Constitutional: Negative for chills, fatigue, fever and unexpected weight change  HENT: Negative for mouth sores and trouble swallowing  Eyes: Negative for pain and visual disturbance  Respiratory: Negative for cough and shortness of breath  Cardiovascular: Negative for chest pain and leg swelling  Gastrointestinal: Negative for abdominal pain, blood in stool, constipation, diarrhea and nausea  Musculoskeletal: Positive for arthralgias  Negative for back pain, joint swelling and myalgias  Skin: Negative for color change and rash  Neurological: Negative for weakness and numbness  Hematological: Negative for adenopathy  Psychiatric/Behavioral: Negative for sleep disturbance         Allergies  Allergies   Allergen Reactions    Iodine Hives and Swelling     12/15/10    Other reaction(s): ANAPHYLACTIC SHOCK    Brimonidine Tartrate     Ciprofloxacin Dizziness    Nuts Itching     Any kind of nuts    Shellfish Allergy     Shellfish-Derived Products Hives and Swelling    Brimonidine Itching and Palpitations    Metronidazole Palpitations       Home Medications    Current Outpatient Medications:     brimonidine tartrate 0 2 % ophthalmic solution, INSTILL ONE DROP INTO BOTH EYES TWICE A DAY, Disp: , Rfl:     brimonidine-timolol (COMBIGAN) 0 2-0 5 %, 1 drop every 12 (twelve) hours, Disp: , Rfl:     cholecalciferol (VITAMIN D3) 1,000 units tablet, Take 5,000 Units by mouth daily , Disp: , Rfl:     cycloSPORINE (RESTASIS MULTIDOSE) 0 05 % ophthalmic emulsion, Administer to both eyes every 12 (twelve) hours , Disp: , Rfl:     latanoprost (XALATAN) 0 005 % ophthalmic solution, INSTILL 1 DROP INTO BOTH EYES AT BEDTIME, Disp: , Rfl:     meloxicam (MOBIC) 15 mg tablet, One daily for 10 days for hip pain (Patient not taking: Reported on 1/15/2021), Disp: 10 tablet, Rfl: 1    timolol (TIMOPTIC) 0 5 % ophthalmic solution, INSTILL 1 DROP INTO BOTH EYES TWICE A DAY, Disp: , Rfl:     TIMOLOL MALEATE OP, 1 gtt BID OD, Disp: , Rfl:   No current facility-administered medications for this visit  Past Medical History  Past Medical History:   Diagnosis Date    Abnormal Pap smear of cervix     Colon polyp     Glaucoma     Heart palpitations     has port to monitor; none since 2009    Hyperlipemia     Mixed anxiety and depressive disorder 9/16/2015    MVP (mitral valve prolapse) 1985       Past Surgical History   Past Surgical History:   Procedure Laterality Date    AUGMENTATION MAMMAPLASTY  2000    BREAST IMPLANT Bilateral     CARDIAC CATHETERIZATION  2009    normal    CARDIAC LOOP RECORDER      CHOLECYSTECTOMY  1995    COLONOSCOPY      HERNIA REPAIR      umbilical    KNEE DISLOCATION SURGERY Right     TOTAL ABDOMINAL HYSTERECTOMY  1999    with LSO; ? 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 -- patient unclear with history    TUBAL LIGATION      WRIST SURGERY Left     states there are screws but not metal   Can go through a metal detector       Family History  No known family history of autoimmune or inflammatory diseases      Family History   Problem Relation Age of Onset    Hypertension Mother     Breast cancer Mother 79    Osteoporosis Mother     Heart attack Father     Diabetes Sister     Diabetes Brother     Heart attack Paternal Grandfather     No Known Problems Maternal Grandmother     No Known Problems Maternal Grandfather     No Known Problems Paternal Grandmother     No Known Problems Maternal Aunt     No Known Problems Maternal Aunt     No Known Problems Paternal Aunt        Social History  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  Social History     Substance and Sexual Activity   Alcohol Use Never    Frequency: Never     Social History     Substance and Sexual Activity   Drug Use Never     Social History     Tobacco Use   Smoking Status Never Smoker   Smokeless Tobacco Never Used   Tobacco Comment    no exposure to passive smoke       Objective:    Vitals:    02/12/21 1316   Pulse: 84   Weight: 66 kg (145 lb 8 1 oz)   Height: 5' 2" (1 575 m)       Physical Exam  Constitutional:       General: She is not in acute distress  Appearance: She is well-developed  HENT:      Head: Normocephalic and atraumatic  Eyes:      General: Lids are normal  No scleral icterus  Conjunctiva/sclera: Conjunctivae normal    Neck:      Musculoskeletal: Neck supple  Pulmonary:      Effort: Pulmonary effort is normal  No tachypnea, accessory muscle usage or respiratory distress  Musculoskeletal:      Comments: Point tenderness over both greater trochanteric bursa  No joint swelling or synovitis anywhere  No fixed rheumatoid deformities  Skin:     General: Skin is dry  Findings: No rash  Neurological:      Mental Status: She is alert  Psychiatric:         Behavior: Behavior normal  Behavior is cooperative  Imaging:   DEXA 8/2019:  Interface, Rad Results In - 08/12/2019  9:01 AM EDT  Clinical history postmenopausal  Hologic DEXA bone densitometry of the lumbar  spine was performed  Average bone density from L1 to L4 measured 0 694 g/ cm2  This corresponds to 66 percent of peak bone mass  Findings are 3 2 standard  deviations below the mean for peak bone mass      Bone densitometry of the left hip was performed  Total bone density of the left  hip measured 0 648 g/ cm2  This corresponds to 69 percent of peak bone  mass  Findings are 2 4 standard deviations below the mean for peak bone mass  In the  left femoral neck,    bone density measures 0 552  g/cm2, corresponding to   65   percent of peak bone mass and 2 7 standard deviations below the mean for peak  bone mass  Bone densitometry of the right forearm was performed  Total bone density of the  distal third radius and ulna measured 0 548 g per square centimeter  This  corresponds to 79% of peak bone mass  Findings are 2 4 standard deviations below  the mean for peak bone mass  IMPRESSION:  Impression: Osteoporosis of the lumbar spine and left femoral neck  Currently  there is substantial increased fracture risk  Labs:    Ref Range & Units 2/2/21  3:34 AM   Glucose 65 - 99 mg/dL 93    BUN 7 - 25 mg/dL 12    Creatinine 0 40 - 1 10 mg/dL 0 60    Sodium 135 - 145 mmol/L 140    Potassium 3 5 - 5 2 mmol/L 3 7    Chloride 100 - 109 mmol/L 111High     Carbon Dioxide 23 - 31 mmol/L 25    Calcium 8 5 - 10 1 mg/dL 8 6    Anion Gap 3 - 11  4    eGFR, Non- >60  98    eGFR,  >60  114       Ref Range & Units 2/2/21  3:34 AM   Hemoglobin 11 5 - 14 5 g/dL 12 6    Hematocrit 35 0 - 43 0 % 36 9    WBC 4 0 - 10 0 thou/cmm 6 6    RBC 3 70 - 4 70 mill/cmm 4 11    Platelet Count 642 - 350 thou/cmm 243    MPV 7 5 - 11 3 fL 8 0    MCV 80 - 100 fL 90    MCH 26 0 - 34 0 pg 30 8    MCHC 32 0 - 37 0 g/dL 34 2    RDW 12 0 - 16 0 % 12 9       Ref Range & Units 2/1/21 12:12 PM   Magnesium 1 4 - 2 2 mg/dL 2  4High        Ref Range & Units 2/1/21 12:12 PM   Phosphorus 2 3 - 4 6 mg/dL 2 8       Ref Range & Units 11/19/20  9:09 AM   Vitamin D, 25-OH 30 - 100 ng/mL 28Low        Ref Range & Units 11/19/20  9:09 AM   Thyroid Stimulating Hormone 0 36 - 3 74 uIU/mL 1 91       Ref Range & Units 3/29/19  7:03 AM   RAHAT, IgG by IFA NEGATIVE  NEGATIVE       Ref Range & Units 8/71/37  2:66 AM   Cyclic Citrullinated Peptide Ab (IgG)  UNITS <16       Ref Range & Units 3/29/19  7:03 AM   Rheumatoid Factor <14 IU/mL <14       Ref Range & Units 3/29/19  7:03 AM   C-Reactive Protein <8 0 mg/L 2 0       Ref Range & Units 3/29/19  7:03 AM   Sed Rate < OR = 30 mm/h 2         Ref Range & Units 3/15/18  1:32 PM   dsDNA AutoAb <10 TITER Negative    SSA (Ro) AutoAb <20 JAZMINE Unit <20    SSB ([place]) AutoAb <20 JAZMINE Unit <20    SM/RNP AutoAb <20 JAZMINE Unit <20    Sm ([place]) AutoAb <20 JAZMINE Unit <20    SCL-70 AutoAb <20 JAZMINE Unit <20    Antinuclear Abs Absent  Absent

## 2021-02-12 ENCOUNTER — TELEPHONE (OUTPATIENT)
Dept: RHEUMATOLOGY | Facility: CLINIC | Age: 61
End: 2021-02-12

## 2021-02-12 ENCOUNTER — OFFICE VISIT (OUTPATIENT)
Dept: RHEUMATOLOGY | Facility: CLINIC | Age: 61
End: 2021-02-12
Payer: COMMERCIAL

## 2021-02-12 VITALS — BODY MASS INDEX: 26.78 KG/M2 | HEIGHT: 62 IN | HEART RATE: 84 BPM | WEIGHT: 145.5 LBS

## 2021-02-12 DIAGNOSIS — M70.62 GREATER TROCHANTERIC BURSITIS OF LEFT HIP: ICD-10-CM

## 2021-02-12 DIAGNOSIS — M81.0 POST-MENOPAUSAL OSTEOPOROSIS: Primary | ICD-10-CM

## 2021-02-12 DIAGNOSIS — G89.29 CHRONIC NECK PAIN: ICD-10-CM

## 2021-02-12 DIAGNOSIS — M70.61 GREATER TROCHANTERIC BURSITIS OF RIGHT HIP: ICD-10-CM

## 2021-02-12 DIAGNOSIS — M54.2 CHRONIC NECK PAIN: ICD-10-CM

## 2021-02-12 PROCEDURE — 96372 THER/PROPH/DIAG INJ SC/IM: CPT | Performed by: INTERNAL MEDICINE

## 2021-02-12 PROCEDURE — 99205 OFFICE O/P NEW HI 60 MIN: CPT | Performed by: INTERNAL MEDICINE

## 2021-02-12 RX ORDER — BUPIVACAINE HYDROCHLORIDE 2.5 MG/ML
2 INJECTION, SOLUTION EPIDURAL; INFILTRATION; INTRACAUDAL ONCE
Status: COMPLETED | OUTPATIENT
Start: 2021-02-12 | End: 2021-02-12

## 2021-02-12 RX ORDER — TRIAMCINOLONE ACETONIDE 40 MG/ML
40 INJECTION, SUSPENSION INTRA-ARTICULAR; INTRAMUSCULAR ONCE
Status: COMPLETED | OUTPATIENT
Start: 2021-02-12 | End: 2021-02-12

## 2021-02-12 RX ADMIN — TRIAMCINOLONE ACETONIDE 40 MG: 40 INJECTION, SUSPENSION INTRA-ARTICULAR; INTRAMUSCULAR at 13:52

## 2021-02-12 RX ADMIN — BUPIVACAINE HYDROCHLORIDE 2 ML: 2.5 INJECTION, SOLUTION EPIDURAL; INFILTRATION; INTRACAUDAL at 13:51

## 2021-02-12 NOTE — TELEPHONE ENCOUNTER
Please submit prior auth for reclast IV 5mg for osteoporosis  No prior treatment but oral bisphosphonates  Contraindicated due to gastritis   She prefers [facility]

## 2021-02-15 NOTE — TELEPHONE ENCOUNTER
[facility] and spoke with Max Tovar  She stated no precert is required for , however this is a preexisting, short term plan, meaning they will not cover anything for pre-existing conditions   The reference # is [id number]

## 2021-02-15 NOTE — TELEPHONE ENCOUNTER
Please let patient know that it is unlikely they will cover this med then since her osteoporosis was diagnosed on dexa in 2019 and she will likely get billed for any medication I prescribe for it

## 2021-02-19 ENCOUNTER — TELEPHONE (OUTPATIENT)
Dept: FAMILY MEDICINE CLINIC | Facility: CLINIC | Age: 61
End: 2021-02-19

## 2021-02-19 ENCOUNTER — TREATMENT (OUTPATIENT)
Dept: FAMILY MEDICINE CLINIC | Facility: CLINIC | Age: 61
End: 2021-02-19

## 2021-02-19 DIAGNOSIS — F41.9 ANXIETY: Primary | ICD-10-CM

## 2021-02-19 RX ORDER — LORAZEPAM 0.5 MG/1
0.5 TABLET ORAL EVERY 8 HOURS PRN
Qty: 30 TABLET | Refills: 1 | Status: SHIPPED | OUTPATIENT
Start: 2021-02-19 | End: 2021-04-23 | Stop reason: ALTCHOICE

## 2021-02-19 NOTE — TELEPHONE ENCOUNTER
Spoke to patient, not having any panic attacks, her BP flucuates and said you would prescribe something for anxiety  Please advise

## 2021-02-23 ENCOUNTER — OFFICE VISIT (OUTPATIENT)
Dept: FAMILY MEDICINE CLINIC | Facility: CLINIC | Age: 61
End: 2021-02-23
Payer: COMMERCIAL

## 2021-02-23 VITALS
WEIGHT: 139.8 LBS | RESPIRATION RATE: 16 BRPM | OXYGEN SATURATION: 97 % | BODY MASS INDEX: 25.73 KG/M2 | TEMPERATURE: 98.6 F | HEIGHT: 62 IN | SYSTOLIC BLOOD PRESSURE: 98 MMHG | HEART RATE: 65 BPM | DIASTOLIC BLOOD PRESSURE: 70 MMHG

## 2021-02-23 DIAGNOSIS — K13.70 MOUTH PROBLEM: Primary | ICD-10-CM

## 2021-02-23 DIAGNOSIS — Z86.16 HISTORY OF COVID-19: ICD-10-CM

## 2021-02-23 PROCEDURE — 99213 OFFICE O/P EST LOW 20 MIN: CPT | Performed by: NURSE PRACTITIONER

## 2021-02-23 RX ORDER — ESCITALOPRAM OXALATE 10 MG/1
10 TABLET ORAL DAILY
COMMUNITY
Start: 2021-02-19 | End: 2021-09-24 | Stop reason: ALTCHOICE

## 2021-02-23 NOTE — PROGRESS NOTES
Subjective:   Chief Complaint   Patient presents with    Mouth Lesions        Patient ID: Roddy Singh is a 56 y o  female  Presents today for concerns over sores in her mouth since December  She began with 1 which was kind red in color which then went away  She currently has 1 as all well as some redness to her gums on her bottom jaw  She has been doing some warm salt water rinses  She was diagnosed with COVID on January 9th and is having some cardiac issues she does have a loop recorder on at this moment  She also is being seen by Neurology for some post COVID issues as well  The following portions of the patient's history were reviewed and updated as appropriate: allergies, current medications, past family history, past medical history, past social history, past surgical history and problem list     Review of Systems   Constitutional: Negative for chills, fatigue and fever  HENT: Positive for mouth sores  Negative for sore throat and trouble swallowing  Respiratory: Negative for cough and shortness of breath  Cardiovascular: Negative for palpitations  Psychiatric/Behavioral: Negative for dysphoric mood  The patient is not nervous/anxious  Objective:  Vitals:    02/23/21 1050   BP: 98/70   BP Location: Left arm   Patient Position: Sitting   Cuff Size: Adult   Pulse: 65   Resp: 16   Temp: 98 6 °F (37 °C)   TempSrc: Tympanic   SpO2: 97%   Weight: 63 4 kg (139 lb 12 8 oz)   Height: 5' 2" (1 575 m)      Physical Exam  Vitals signs reviewed  Constitutional:       Appearance: Normal appearance  HENT:      Mouth/Throat:      Mouth: Mucous membranes are moist  No oral lesions  Dentition: Dental caries present  Tongue: No lesions  Pharynx: Oropharynx is clear  No oropharyngeal exudate or posterior oropharyngeal erythema  Cardiovascular:      Rate and Rhythm: Normal rate and regular rhythm  Pulses: Normal pulses        Heart sounds: Normal heart sounds  Pulmonary:      Effort: Pulmonary effort is normal       Breath sounds: Normal breath sounds  Skin:     General: Skin is warm and dry  Neurological:      Mental Status: She is alert and oriented to person, place, and time  Psychiatric:         Mood and Affect: Mood normal          Behavior: Behavior normal            Assessment/Plan:    No problem-specific Assessment & Plan notes found for this encounter  Diagnoses and all orders for this visit:    Mouth problem  Comments:  Normal mouth  Recommend to see Dentist    History of COVID-19  -     SARS-CoV2 Antibody, Total (IgG, IgA, IgM) [facility]; Future    Other orders  -     escitalopram (LEXAPRO) 10 mg tablet;  Take 10 mg by mouth daily

## 2021-02-26 LAB
EXTERNAL SARS COV-2 ANITBODY IGM: NORMAL
EXTERNAL SARS COV-2 ANTIBODY IGG: REACTIVE

## 2021-03-01 ENCOUNTER — NURSE TRIAGE (OUTPATIENT)
Dept: OTHER | Facility: OTHER | Age: 61
End: 2021-03-01

## 2021-03-01 ENCOUNTER — TELEPHONE (OUTPATIENT)
Dept: FAMILY MEDICINE CLINIC | Facility: CLINIC | Age: 61
End: 2021-03-01

## 2021-03-01 NOTE — TELEPHONE ENCOUNTER
Left message for patient to call the office, per [facility]   Please call patient with positive for Cvid antibodies

## 2021-03-02 NOTE — TELEPHONE ENCOUNTER
Reason for Disposition   [1] Chest pain lasts > 5 minutes AND [2] occurred in past 3 days (72 hours)    Additional Information   Symptom is main concern  (e g , headache, chest pain)    Answer Assessment - Initial Assessment Questions  1  LOCATION: "Where does it hurt?"        Middle of the chest   2  RADIATION: "Does the pain go anywhere else?" (e g , into neck, jaw, arms, back)       Back    3  ONSET: "When did the chest pain begin?" (Minutes, hours or days)        Around 1700 today, lasted for an hour with elevated  BP up 180/98  4  PATTERN "Does the pain come and go, or has it been constant since it started?"  "Does it get worse with exertion?"        Patient felt palpitations with "soreness in a chest"   5  DURATION: "How long does it last" (e g , seconds, minutes, hours)      About an hours   6  SEVERITY: "How bad is the pain?"  (e g , Scale 1-10; mild, moderate, or severe)     - MILD (1-3): doesn't interfere with normal activities      - MODERATE (4-7): interferes with normal activities or awakens from sleep     - SEVERE (8-10): excruciating pain, unable to do any normal activities        Mild 2 out of 10   7  CARDIAC RISK FACTORS: "Do you have any history of heart problems or risk factors for heart disease?" (e g , angina, prior heart attack; diabetes, high blood pressure, high cholesterol, smoker, or strong family history of heart disease)      No   8  PULMONARY RISK FACTORS: "Do you have any history of lung disease?"  (e g , blood clots in lung, asthma, emphysema, birth control pills)      No   9  CAUSE: "What do you think is causing the chest pain?"     Patient stated that she had more likely an anxiety attack and felt better after taking Xanax   10  OTHER SYMPTOMS: "Do you have any other symptoms?" (e g , dizziness, nausea, vomiting, sweating, fever, difficulty breathing, cough)       Mild  nausea   11   PREGNANCY: "Is there any chance you are pregnant?" "When was your last menstrual period?" No    Answer Assessment - Initial Assessment Questions  1  BLOOD PRESSURE: "What is the blood pressure?" "Did you take at least two measurements 5 minutes apart?"      141/91 at 2016 today   2  ONSET: "When did you take your blood pressure?"      Patient's developed elevated BP after she was diagnosed with Covid 19 on 1/9/2021  3  HOW: "How did you obtain the blood pressure?" (e g , visiting nurse, automatic home BP monitor)      Automatic BP cuff   4  HISTORY: "Do you have a history of high blood pressure?"      No   5  MEDICATIONS: "Are you taking any medications for blood pressure?" "Have you missed any doses recently?"      No   6   OTHER SYMPTOMS: "Do you have any symptoms?" (e g , headache, chest pain, blurred vision, difficulty breathing, weakness)      Headache 4 out of 10    Protocols used: CHEST PAIN-ADULT-AH, HIGH BLOOD PRESSURE-ADULT-AH

## 2021-03-02 NOTE — TELEPHONE ENCOUNTER
Regarding: chest discomfort  ----- Message from [facility] sent at 3/1/2021  7:58 PM EST -----  "My blood pressure is 138/90    I am currently experiencing chest discomfort "

## 2021-03-02 NOTE — TELEPHONE ENCOUNTER
Patient has been wearing 30 days monitor, patient hasn't received any concern call from monitoring center  Patient called paramedics around 1800 today  Paramedic team did EKG, there were no abnormalities per paramedic team  Patient stated that her BP went down to 141/91 after she took Xanax when she was assessed by  paramedic team  Patient refused to go to ER, called [facility] to follow up with PCP

## 2021-03-02 NOTE — TELEPHONE ENCOUNTER
Dr Eduarda Ríos on call was made aware of patient's symptoms  Dr Eduarda Ríos confirmed that he would call patient now

## 2021-03-10 RX ORDER — FLUTICASONE PROPIONATE 50 MCG
2 SPRAY, SUSPENSION (ML) NASAL DAILY
COMMUNITY
Start: 2021-02-25 | End: 2021-09-24

## 2021-03-10 RX ORDER — ATORVASTATIN CALCIUM 40 MG/1
40 TABLET, FILM COATED ORAL DAILY
COMMUNITY
Start: 2021-03-04 | End: 2021-04-03

## 2021-03-10 RX ORDER — METOPROLOL SUCCINATE 25 MG/1
12.5 TABLET, EXTENDED RELEASE ORAL DAILY
COMMUNITY
Start: 2021-03-05 | End: 2021-04-04

## 2021-03-11 ENCOUNTER — OFFICE VISIT (OUTPATIENT)
Dept: FAMILY MEDICINE CLINIC | Facility: CLINIC | Age: 61
End: 2021-03-11
Payer: COMMERCIAL

## 2021-03-11 VITALS
OXYGEN SATURATION: 99 % | BODY MASS INDEX: 25.69 KG/M2 | HEART RATE: 70 BPM | TEMPERATURE: 98.9 F | SYSTOLIC BLOOD PRESSURE: 116 MMHG | RESPIRATION RATE: 16 BRPM | DIASTOLIC BLOOD PRESSURE: 70 MMHG | WEIGHT: 139.6 LBS | HEIGHT: 62 IN

## 2021-03-11 DIAGNOSIS — H25.13 AGE-RELATED NUCLEAR CATARACT, BILATERAL: ICD-10-CM

## 2021-03-11 DIAGNOSIS — I21.4 NSTEMI, INITIAL EPISODE OF CARE (HCC): ICD-10-CM

## 2021-03-11 DIAGNOSIS — F32.A ANXIETY AND DEPRESSION: Primary | ICD-10-CM

## 2021-03-11 DIAGNOSIS — F41.9 ANXIETY AND DEPRESSION: Primary | ICD-10-CM

## 2021-03-11 DIAGNOSIS — H91.8X1 OTHER HEARING LOSS OF RIGHT EAR WITH UNRESTRICTED HEARING OF LEFT EAR: ICD-10-CM

## 2021-03-11 PROCEDURE — 1036F TOBACCO NON-USER: CPT | Performed by: NURSE PRACTITIONER

## 2021-03-11 PROCEDURE — 99214 OFFICE O/P EST MOD 30 MIN: CPT | Performed by: NURSE PRACTITIONER

## 2021-03-11 PROCEDURE — 3008F BODY MASS INDEX DOCD: CPT | Performed by: NURSE PRACTITIONER

## 2021-03-11 RX ORDER — BUSPIRONE HYDROCHLORIDE 5 MG/1
5 TABLET ORAL 2 TIMES DAILY
Qty: 60 TABLET | Refills: 5 | Status: SHIPPED | OUTPATIENT
Start: 2021-03-11 | End: 2021-04-23

## 2021-03-11 NOTE — PROGRESS NOTES
Subjective:   No chief complaint on file  Patient ID: Williams Araujo is a 56 y o  female  Presents today for follow up from recent hospitalization  Patient was admitted for suspicion of NSTEMI on 03/01/2021  Serial troponins were elevated and  left heart catheterization on 3/2 revealed normal coronary vessels  LV gram revealed reduced EF 35-40 %  Patient was discharged home on 3/04/2021 with prescription for Metoprolol 12 5 mg daily and instructed to follow up with cardiology  Her first outpatient appointment with cardiology was today and she was instructed to increase her Metoprolol to 25 mg daily  She is no longer taking Atorvastatin at the request of her cardiologist   She states that she continues to be weak, dizzy and has a decreased appetite  She also reports having some anxiety  She takes Lexapro 10 mg daily and does not take her as needed Lorazepam 0 5 mg  She states that her anxiety is increased "when she opens up the computer"  She reports that this may be due to her Cataracts because she is having difficulty seeing  She also states that she is having difficulty while on the phone due to an "ear problem"  She states that she is currently on a medical leave of absence and states that she can't go back to work due to her inability to use the computer and phone  The following portions of the patient's history were reviewed and updated as appropriate: allergies, current medications, past family history, past medical history, past social history, past surgical history and problem list     Review of Systems   Constitutional: Negative for chills, fatigue and fever  Respiratory: Negative for cough, shortness of breath and wheezing  Cardiovascular: Positive for palpitations  Negative for chest pain and leg swelling  Gastrointestinal: Negative for abdominal pain, diarrhea, nausea and vomiting  Genitourinary: Negative for difficulty urinating     Musculoskeletal: Positive for arthralgias and back pain  Negative for joint swelling  Skin: Negative for rash  Neurological: Positive for dizziness (post Covid) and headaches (Post Covid)  Psychiatric/Behavioral: Negative for dysphoric mood  The patient is nervous/anxious (being treated)  Objective:  Vitals:    03/11/21 1327   BP: 116/70   BP Location: Left arm   Patient Position: Sitting   Cuff Size: Adult   Pulse: 70   Resp: 16   Temp: 98 9 °F (37 2 °C)   TempSrc: Tympanic   SpO2: 99%   Weight: 63 3 kg (139 lb 9 6 oz)   Height: 5' 2" (1 575 m)      Physical Exam  Constitutional:       Appearance: Normal appearance  HENT:      Head: Normocephalic and atraumatic  Cardiovascular:      Rate and Rhythm: Normal rate and regular rhythm  Pulses: Normal pulses  Heart sounds: Normal heart sounds  Pulmonary:      Effort: Pulmonary effort is normal       Breath sounds: Normal breath sounds  Abdominal:      General: Bowel sounds are normal       Palpations: Abdomen is soft  Musculoskeletal: Normal range of motion  Skin:     General: Skin is warm and dry  Neurological:      Mental Status: She is alert and oriented to person, place, and time  Psychiatric:         Mood and Affect: Mood normal          Behavior: Behavior normal            Assessment/Plan:    No problem-specific Assessment & Plan notes found for this encounter  Diagnoses and all orders for this visit:    Anxiety and depression  -     busPIRone (BUSPAR) 5 mg tablet; Take 1 tablet (5 mg total) by mouth 2 (two) times a day  -     Ambulatory referral to Psychology; Future    NSTEMI, initial episode of care [facility])  Comments:  followed by cardiology    Age-related nuclear cataract, bilateral  Comments:  followed by opthalmology    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

## 2021-03-17 ENCOUNTER — TELEPHONE (OUTPATIENT)
Dept: FAMILY MEDICINE CLINIC | Facility: CLINIC | Age: 61
End: 2021-03-17

## 2021-03-29 ENCOUNTER — TELEPHONE (OUTPATIENT)
Dept: FAMILY MEDICINE CLINIC | Facility: CLINIC | Age: 61
End: 2021-03-29

## 2021-03-29 NOTE — TELEPHONE ENCOUNTER
Made appt for 4/23/2021 first visit she could find to schedule herself on mychart   Afraid covid affected her kidneys   Very painful  Does not want any other provider   Did not get results of urine from last visit   Urine has white things in it  She is upset   Please advise   [phone]

## 2021-03-29 NOTE — TELEPHONE ENCOUNTER
Urine test from March 2nd was all normal   She may be seeing mucus clumps in the urine which is common    The pain she is experiencing is likely musculoskeletal from COVID rather than anything to

## 2021-04-19 ENCOUNTER — TELEPHONE (OUTPATIENT)
Dept: OBGYN CLINIC | Facility: HOSPITAL | Age: 61
End: 2021-04-19

## 2021-04-19 NOTE — TELEPHONE ENCOUNTER
Faxed 2/12 office note to [facility] from [employer] @ [phone]    Chart # [id number]

## 2021-04-21 RX ORDER — METOPROLOL SUCCINATE 25 MG/1
25 TABLET, EXTENDED RELEASE ORAL
COMMUNITY
Start: 2021-03-11 | End: 2021-09-24

## 2021-04-23 ENCOUNTER — OFFICE VISIT (OUTPATIENT)
Dept: FAMILY MEDICINE CLINIC | Facility: CLINIC | Age: 61
End: 2021-04-23
Payer: COMMERCIAL

## 2021-04-23 VITALS
DIASTOLIC BLOOD PRESSURE: 72 MMHG | TEMPERATURE: 98 F | HEIGHT: 62 IN | OXYGEN SATURATION: 99 % | WEIGHT: 141.4 LBS | BODY MASS INDEX: 26.02 KG/M2 | SYSTOLIC BLOOD PRESSURE: 118 MMHG | HEART RATE: 64 BPM

## 2021-04-23 DIAGNOSIS — U07.1 COVID-19: Chronic | ICD-10-CM

## 2021-04-23 DIAGNOSIS — M54.6 CHRONIC MIDLINE THORACIC BACK PAIN: Primary | ICD-10-CM

## 2021-04-23 DIAGNOSIS — G89.29 CHRONIC MIDLINE THORACIC BACK PAIN: Primary | ICD-10-CM

## 2021-04-23 PROCEDURE — 3725F SCREEN DEPRESSION PERFORMED: CPT | Performed by: FAMILY MEDICINE

## 2021-04-23 PROCEDURE — 1036F TOBACCO NON-USER: CPT | Performed by: FAMILY MEDICINE

## 2021-04-23 PROCEDURE — 3008F BODY MASS INDEX DOCD: CPT | Performed by: FAMILY MEDICINE

## 2021-04-23 PROCEDURE — 99402 PREV MED CNSL INDIV APPRX 30: CPT | Performed by: FAMILY MEDICINE

## 2021-04-23 RX ORDER — LOSARTAN POTASSIUM 25 MG/1
TABLET ORAL
COMMUNITY

## 2021-04-23 NOTE — PROGRESS NOTES
Assessment/Plan:    No problem-specific Assessment & Plan notes found for this encounter  There are no diagnoses linked to this encounter  Subjective:      Patient ID: Hyun Sánchez is a 56 y o  female  F/U post Covid sx ; Had been seeing cardio  Recent studies reviewed  The following portions of the patient's history were reviewed and updated as appropriate: allergies, current medications, past family history, past medical history, past social history, past surgical history and problem list     Review of Systems      Objective:  Vitals:    04/23/21 0954   BP: 118/72   BP Location: Left arm   Patient Position: Sitting   Cuff Size: Standard   Pulse: 64   Temp: 98 °F (36 7 °C)   TempSrc: Temporal   SpO2: 99%   Weight: 64 1 kg (141 lb 6 4 oz)   Height: 5' 2" (1 575 m)      Physical Exam  Constitutional:       Appearance: She is well-developed  Eyes:      Conjunctiva/sclera: Conjunctivae normal    Pulmonary:      Effort: Pulmonary effort is normal    Skin:     General: Skin is warm and dry  Psychiatric:         Thought Content: Thought content normal          More than half of this 25 minute visit spent counseling and coordinating care

## 2021-04-23 NOTE — PATIENT INSTRUCTIONS
You are due for tetanus update : I recommend Adult Dt  Consider Covid vaccine   When you get a new doctor in [place] make sure that they are a residency trained family physician  I recommend someone around 44-55 years of age  Lower Back Exercises   WHAT YOU NEED TO KNOW:   Lower back exercises help heal and strengthen your back muscles to prevent another injury  Ask your healthcare provider if you need to see a physical therapist for more advanced exercises  DISCHARGE INSTRUCTIONS:   Return to the emergency department if:   · You have severe pain that prevents you from moving  Contact your healthcare provider if:   · Your pain becomes worse  · You have new pain  · You have questions or concerns about your condition or care  Do lower back exercises safely:   · Do the exercises on a mat or firm surface  (not on a bed) to support your spine and prevent low back pain  · Move slowly and smoothly  Avoid fast or jerky motions  · Breathe normally  Do not hold your breath  · Stop if you feel pain  It is normal to feel some discomfort at first  Regular exercise will help decrease your discomfort over time  Lower back exercises: Your healthcare provider may recommend that you do back exercises 10 to 30 minutes each day  He may also recommend that you do exercises 1 to 3 times each day  Ask your healthcare provider which exercises are best for you and how often to do them  · Ankle pumps:  Lie on your back  Move your foot up (with your toes pointing toward your head)  Then, move your foot down (with your toes pointing away from you)  Repeat this exercise 10 times on each side  · Heel slides:  Lie on your back  Slowly bend one leg and then straighten it  Next, bend the other leg and then straighten it  Repeat 10 times on each side  · Pelvic tilt:  Lie on your back with your knees bent and feet flat on the floor   Place your arms in a relaxed position beside your body  Tighten the muscles of your abdomen and flatten your back against the floor  Hold for 5 seconds  Repeat 5 times  · Back stretch:  Lie on your back with your hands behind your head  Bend your knees and turn the lower half of your body to one side  Hold this position for 10 seconds  Repeat 3 times on each side  · Straight leg raises:  Lie on your back with one leg straight  Bend the other knee  Tighten your abdomen and then slowly lift the straight leg up about 6 to 12 inches off the floor  Hold for 1 to 5 seconds  Lower your leg slowly  Repeat 10 times on each leg  · Knee-to-chest:  Lie on your back with your knees bent and feet flat on the floor  Pull one of your knees toward your chest and hold it there for 5 seconds  Return your leg to the starting position  Lift the other knee toward your chest and hold for 5 seconds  Do this 5 times on each side  · Cat and camel:  Place your hands and knees on the floor  Arch your back upward toward the ceiling and lower your head  Round out your spine as much as you can  Hold for 5 seconds  Lift your head upward and push your chest downward toward the floor  Hold for 5 seconds  Do 3 sets or as directed  · Wall squats:  Stand with your back against a wall  Tighten the muscles of your abdomen  Slowly lower your body until your knees are bent at a 45 degree angle  Hold this position for 5 seconds  Slowly move back up to a standing position  Repeat 10 times  · Curl up:  Lie on your back with your knees bent and feet flat on the floor  Place your hands, palms down, underneath the curve in your lower back  Next, with your elbows on the floor, lift your shoulders and chest 2 to 3 inches  Keep your head in line with your shoulders  Hold this position for 5 seconds  When you can do this exercise without pain for 10 to 15 seconds, you may add a rotation   While your shoulders and chest are lifted off the ground, turn slightly to the left and hold  Repeat on the other side  · Bird dog:  Place your hands and knees on the floor  Keep your wrists directly below your shoulders and your knees directly below your hips  Pull your belly button in toward your spine  Do not flatten or arch your back  Tighten your abdominal muscles  Raise one arm straight out so that it is aligned with your head  Next, raise the leg opposite your arm  Hold this position for 15 seconds  Lower your arm and leg slowly and change sides  Do 5 sets  © Copyright [address] Information is for End User's use only and may not be sold, redistributed or otherwise used for commercial purposes  All illustrations and images included in CareNotes® are the copyrighted property of A D A M , Inc  or [address] [place], [place] [id number]   The above information is an  only  It is not intended as medical advice for individual conditions or treatments  Talk to your doctor, nurse or pharmacist before following any medical regimen to see if it is safe and effective for you

## 2021-05-04 ENCOUNTER — TELEPHONE (OUTPATIENT)
Dept: FAMILY MEDICINE CLINIC | Facility: CLINIC | Age: 61
End: 2021-05-04

## 2021-05-04 NOTE — TELEPHONE ENCOUNTER
Patient wondering if it is safe to take a multivitamin(this has 1000 units of vitamin d3), vitamin d3 (5000 units), and immune renew which is a mushroom based immune booster  She wants to make sure these won't interact with her losartan and metoproplol and that 6000units of vitamin d3 is ok  Patient would like to know if she can get a vitamin d done to check her levels  Please advise

## 2021-06-10 ENCOUNTER — OFFICE VISIT (OUTPATIENT)
Dept: URGENT CARE | Facility: MEDICAL CENTER | Age: 61
End: 2021-06-10
Payer: COMMERCIAL

## 2021-06-10 VITALS
HEIGHT: 63 IN | RESPIRATION RATE: 20 BRPM | BODY MASS INDEX: 24.63 KG/M2 | OXYGEN SATURATION: 97 % | TEMPERATURE: 99.4 F | SYSTOLIC BLOOD PRESSURE: 112 MMHG | DIASTOLIC BLOOD PRESSURE: 73 MMHG | HEART RATE: 60 BPM | WEIGHT: 139 LBS

## 2021-06-10 DIAGNOSIS — R10.9 ACUTE POSTOPERATIVE PAIN OF ABDOMEN: Primary | ICD-10-CM

## 2021-06-10 DIAGNOSIS — G89.18 ACUTE POSTOPERATIVE PAIN OF ABDOMEN: Primary | ICD-10-CM

## 2021-06-10 DIAGNOSIS — R10.31 RLQ ABDOMINAL PAIN: ICD-10-CM

## 2021-06-10 PROCEDURE — 99213 OFFICE O/P EST LOW 20 MIN: CPT | Performed by: PHYSICIAN ASSISTANT

## 2021-06-10 NOTE — PATIENT INSTRUCTIONS
Abdominal Pain   WHAT YOU NEED TO KNOW:   Abdominal pain can be dull, achy, or sharp  You may have pain in one area of your abdomen, or in your entire abdomen  Your pain may be caused by a condition such as constipation, food sensitivity or poisoning, infection, or a blockage  Abdominal pain can also be from a hernia, appendicitis, or an ulcer  Liver, gallbladder, or kidney conditions can also cause abdominal pain  The cause of your abdominal pain may be unknown  DISCHARGE INSTRUCTIONS:   Return to the emergency department if:   · You have new chest pain or shortness of breath  · You have pulsing pain in your upper abdomen or lower back that suddenly becomes constant  · Your pain is in the right lower abdominal area and worsens with movement  · You have a fever over 100 4°F (38°C) or shaking chills  · You are vomiting and cannot keep food or liquids down  · Your pain does not improve or gets worse over the next 8 to 12 hours  · You see blood in your vomit or bowel movements, or they look black and tarry  · Your skin or the whites of your eyes turn yellow  · You are a woman and have a large amount of vaginal bleeding that is not your monthly period  Contact your healthcare provider if:   · You have pain in your lower back  · You are a man and have pain in your testicles  · You have pain when you urinate  · You have questions or concerns about your condition or care  Follow up with your healthcare provider within 24 hours or as directed:  Write down your questions so you remember to ask them during your visits  Medicines:   · Medicines  may be given to calm your stomach and prevent vomiting or to decrease pain  Ask how to take pain medicine safely  · Take your medicine as directed  Contact your healthcare provider if you think your medicine is not helping or if you have side effects  Tell him of her if you are allergic to any medicine   Keep a list of the medicines, vitamins, and herbs you take  Include the amounts, and when and why you take them  Bring the list or the pill bottles to follow-up visits  Carry your medicine list with you in case of an emergency  © Copyright [address] Information is for End User's use only and may not be sold, redistributed or otherwise used for commercial purposes  All illustrations and images included in CareNotes® are the copyrighted property of A D A M , Inc  or [facility] Theron Grant   The above information is an  only  It is not intended as medical advice for individual conditions or treatments  Talk to your doctor, nurse or pharmacist before following any medical regimen to see if it is safe and effective for you

## 2021-06-10 NOTE — PROGRESS NOTES
[facility]        NAME: Perry Car is a 56 y o  female  : 1960    MRN: [id number]  DATE: Nithya 10, 2021  TIME: 12:42 PM    /73   Pulse 60   Temp 99 4 °F (37 4 °C)   Resp 20   Ht 5' 3" (1 6 m)   Wt 63 kg (139 lb)   SpO2 97%   BMI 24 62 kg/m²     Assessment and Plan   Acute postoperative pain of abdomen [G89 18, R10 9]  1  Acute postoperative pain of abdomen     2  RLQ abdominal pain           Patient Instructions       Follow up with PCP in 3-5 days  Proceed to  ER if symptoms worsen  Chief Complaint     Chief Complaint   Patient presents with    Abdominal Pain     Patient states she has been aving RLQ pain for three days, she also has dizziness and nausea         History of Present Illness       Pt with rlq pain x 3 days  Pt with dizziness and nausea  Walking hurts abdomen      Review of Systems   Review of Systems   Constitutional: Negative  HENT: Negative  Eyes: Negative  Respiratory: Negative  Cardiovascular: Negative  Gastrointestinal: Positive for abdominal pain and nausea  Endocrine: Negative  Genitourinary: Negative  Musculoskeletal: Negative  Skin: Negative  Allergic/Immunologic: Negative  Neurological: Negative  Hematological: Negative  All other systems reviewed and are negative          Current Medications       Current Outpatient Medications:     brimonidine-timolol (COMBIGAN) 0 2-0 5 %, 1 drop every 12 (twelve) hours, Disp: , Rfl:     cholecalciferol (VITAMIN D3) 1,000 units tablet, Take 5,000 Units by mouth daily , Disp: , Rfl:     latanoprost (XALATAN) 0 005 % ophthalmic solution, INSTILL 1 DROP INTO BOTH EYES AT BEDTIME, Disp: , Rfl:     timolol (TIMOPTIC) 0 5 % ophthalmic solution, INSTILL 1 DROP INTO BOTH EYES TWICE A DAY, Disp: , Rfl:     TIMOLOL MALEATE OP, 1 gtt BID OD, Disp: , Rfl:     brimonidine tartrate 0 2 % ophthalmic solution, INSTILL ONE DROP INTO BOTH EYES TWICE A DAY, Disp: , Rfl:     escitalopram (LEXAPRO) 10 mg tablet, Take 10 mg by mouth daily, Disp: , Rfl:     fluticasone (FLONASE) 50 mcg/act nasal spray, 2 sprays into each nostril daily, Disp: , Rfl:     losartan (COZAAR) 25 mg tablet, , Disp: , Rfl:     metoprolol succinate (TOPROL-XL) 25 mg 24 hr tablet, Take 25 mg by mouth, Disp: , Rfl:     Current Allergies     Allergies as of 06/10/2021 - Reviewed 06/10/2021   Allergen Reaction Noted    Iodine - food allergy Hives and Swelling 06/27/2009    Brimonidine tartrate  05/29/2019    Ciprofloxacin Dizziness 02/10/2021    Nuts - food allergy Itching 12/20/2016    Shellfish allergy - food allergy  05/29/2019    Shellfish-derived products - food allergy Hives and Swelling 06/27/2009    Brimonidine Itching and Palpitations 03/10/2016    Metronidazole Palpitations 02/10/2021            The following portions of the patient's history were reviewed and updated as appropriate: allergies, current medications, past family history, past medical history, past social history, past surgical history and problem list      Past Medical History:   Diagnosis Date    Abnormal Pap smear of cervix     Colon polyp     Glaucoma     Heart palpitations     has port to monitor; none since 2009    Hyperlipemia     Mixed anxiety and depressive disorder 9/16/2015    MVP (mitral valve prolapse) 1985       Past Surgical History:   Procedure Laterality Date    AUGMENTATION MAMMAPLASTY  2000    BREAST IMPLANT Bilateral     CARDIAC CATHETERIZATION  2009    normal    CARDIAC LOOP RECORDER      CHOLECYSTECTOMY  1995    COLONOSCOPY      HERNIA REPAIR      umbilical    KNEE DISLOCATION SURGERY Right     TOTAL ABDOMINAL HYSTERECTOMY  1999    with LSO; ? 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 -- patient unclear with history    TUBAL LIGATION      WRIST SURGERY Left     states there are screws but not metal   Can go through a metal detector       Family History   Problem Relation Age of Onset    Hypertension Mother     Breast cancer Mother 79    Osteoporosis Mother     Heart attack Father     Diabetes Sister     Diabetes Brother     Heart attack Paternal Grandfather     No Known Problems Maternal Grandmother     No Known Problems Maternal Grandfather     No Known Problems Paternal Grandmother     No Known Problems Maternal Aunt     No Known Problems Maternal Aunt     No Known Problems Paternal Aunt          Medications have been verified  Objective   /73   Pulse 60   Temp 99 4 °F (37 4 °C)   Resp 20   Ht 5' 3" (1 6 m)   Wt 63 kg (139 lb)   SpO2 97%   BMI 24 62 kg/m²        Physical Exam     Physical Exam  Vitals signs and nursing note reviewed  Constitutional:       Appearance: She is well-developed and normal weight  Comments: Pt will go to er for further evaluation for appendicitis    HENT:      Head: Normocephalic and atraumatic  Mouth/Throat:      Mouth: Mucous membranes are moist       Pharynx: Oropharynx is clear  Eyes:      Extraocular Movements: Extraocular movements intact  Pupils: Pupils are equal, round, and reactive to light  Cardiovascular:      Rate and Rhythm: Normal rate and regular rhythm  Heart sounds: Normal heart sounds  Pulmonary:      Effort: Pulmonary effort is normal       Breath sounds: Normal breath sounds  Abdominal:      General: Abdomen is flat  Bowel sounds are normal       Palpations: Abdomen is soft  Tenderness: There is abdominal tenderness in the right lower quadrant  There is guarding and rebound  Positive signs include McBurney's sign and psoas sign  Neurological:      General: No focal deficit present  Mental Status: She is alert     Psychiatric:         Mood and Affect: Mood normal

## 2021-06-16 ENCOUNTER — TELEPHONE (OUTPATIENT)
Dept: FAMILY MEDICINE CLINIC | Facility: CLINIC | Age: 61
End: 2021-06-16

## 2021-06-16 NOTE — TELEPHONE ENCOUNTER
Phone the patient  Tell her that I reviewed her hospital record and the MRI the heart did not show any evidence of myocarditis  There is no evidence that the 1st COVID-19 shot had anything to do with her stress induced cardiomyopathy  It is okay for her to get her 2nd COVID 19 vaccine tomorrow  If she has further questions, she could call her cardiologist, Dr Plata Shed

## 2021-06-16 NOTE — TELEPHONE ENCOUNTER
Patient called was diagnosed with stress induced cardiomyopathy on 5/4/21 in the ER  She is scheduled to have 2nd covid vaccine tomorrow 6/17/21 and wants to know if she is safe to get vaccine  Patient read that 2nd vaccine causes inflammation of the heart   Please advise

## 2021-06-28 ENCOUNTER — TREATMENT (OUTPATIENT)
Dept: FAMILY MEDICINE CLINIC | Facility: CLINIC | Age: 61
End: 2021-06-28

## 2021-06-28 ENCOUNTER — TELEPHONE (OUTPATIENT)
Dept: FAMILY MEDICINE CLINIC | Facility: CLINIC | Age: 61
End: 2021-06-28

## 2021-06-28 DIAGNOSIS — I21.4 NSTEMI, INITIAL EPISODE OF CARE (HCC): Primary | ICD-10-CM

## 2021-06-28 DIAGNOSIS — E05.00 TOXIC GOITER: Primary | ICD-10-CM

## 2021-06-28 DIAGNOSIS — M81.0 AGE-RELATED OSTEOPOROSIS WITHOUT CURRENT PATHOLOGICAL FRACTURE: Chronic | ICD-10-CM

## 2021-06-28 NOTE — TELEPHONE ENCOUNTER
Patient given message   she DOES want her cholesterol checked  and is willing to pay for it   she is on a diet and states her cardiologist said she should watch her cholesterol

## 2021-06-28 NOTE — TELEPHONE ENCOUNTER
Patient had a lipid panel done in May  Insurance generally will not cover anything unless it is more than 6 months      Concerning the vitamin-D I will order that level

## 2021-07-13 ENCOUNTER — APPOINTMENT (OUTPATIENT)
Dept: LAB | Facility: CLINIC | Age: 61
End: 2021-07-13
Payer: COMMERCIAL

## 2021-07-13 DIAGNOSIS — I21.4 NSTEMI, INITIAL EPISODE OF CARE (HCC): ICD-10-CM

## 2021-07-13 DIAGNOSIS — M81.0 AGE-RELATED OSTEOPOROSIS WITHOUT CURRENT PATHOLOGICAL FRACTURE: Chronic | ICD-10-CM

## 2021-07-13 LAB
25(OH)D3 SERPL-MCNC: 49.2 NG/ML (ref 30–100)
CHOLEST SERPL-MCNC: 251 MG/DL (ref 50–200)
HDLC SERPL-MCNC: 64 MG/DL
LDLC SERPL CALC-MCNC: 154 MG/DL (ref 0–100)
NONHDLC SERPL-MCNC: 187 MG/DL
TRIGL SERPL-MCNC: 165 MG/DL

## 2021-07-13 PROCEDURE — 82306 VITAMIN D 25 HYDROXY: CPT

## 2021-07-13 PROCEDURE — 80061 LIPID PANEL: CPT

## 2021-07-13 PROCEDURE — 36415 COLL VENOUS BLD VENIPUNCTURE: CPT

## 2021-07-14 ENCOUNTER — TREATMENT (OUTPATIENT)
Dept: FAMILY MEDICINE CLINIC | Facility: CLINIC | Age: 61
End: 2021-07-14

## 2021-07-15 ENCOUNTER — OFFICE VISIT (OUTPATIENT)
Dept: FAMILY MEDICINE CLINIC | Facility: CLINIC | Age: 61
End: 2021-07-15
Payer: COMMERCIAL

## 2021-07-15 VITALS
SYSTOLIC BLOOD PRESSURE: 124 MMHG | BODY MASS INDEX: 25.27 KG/M2 | TEMPERATURE: 98.2 F | OXYGEN SATURATION: 98 % | HEART RATE: 53 BPM | RESPIRATION RATE: 17 BRPM | WEIGHT: 142.6 LBS | DIASTOLIC BLOOD PRESSURE: 70 MMHG | HEIGHT: 63 IN

## 2021-07-15 DIAGNOSIS — R42 VERTIGO: Primary | ICD-10-CM

## 2021-07-15 PROCEDURE — 99214 OFFICE O/P EST MOD 30 MIN: CPT | Performed by: NURSE PRACTITIONER

## 2021-07-15 RX ORDER — MECLIZINE HCL 12.5 MG/1
12.5 TABLET ORAL EVERY 8 HOURS PRN
Qty: 30 TABLET | Refills: 0 | Status: SHIPPED | OUTPATIENT
Start: 2021-07-15 | End: 2021-09-24

## 2021-07-15 NOTE — PROGRESS NOTES
Subjective:   Chief Complaint   Patient presents with    Dizziness        Patient ID: Cami Fu is a 56 y o  female  Presents today for complaints of dizziness since about May of 2021  She has been taking Vertifix which is an herbal supplement for motion sickness which has helped somewhat  She has been taking this 2 times a daily for about 2 months  She still continues to feel lightheaded with some imbalance and fluctuating horizon  Discussed switching to meclizine and going to physical therapy for vestibular exercises      The following portions of the patient's history were reviewed and updated as appropriate: allergies, current medications, past family history, past medical history, past social history, past surgical history and problem list     Review of Systems   Constitutional: Negative for chills and fever  Respiratory: Negative for cough and shortness of breath  Cardiovascular: Negative for palpitations and leg swelling  Neurological: Positive for dizziness and light-headedness  Psychiatric/Behavioral: Negative for dysphoric mood  The patient is not nervous/anxious  Objective:  Vitals:    07/15/21 1136   BP: 124/70   BP Location: Left arm   Patient Position: Sitting   Cuff Size: Adult   Pulse: (!) 53   Resp: 17   Temp: 98 2 °F (36 8 °C)   TempSrc: Tympanic   SpO2: 98%   Weight: 64 7 kg (142 lb 9 6 oz)   Height: 5' 3" (1 6 m)      Physical Exam  Vitals reviewed  Constitutional:       Appearance: Normal appearance  Eyes:      General:         Right eye: No discharge  Left eye: No discharge  Extraocular Movements: Extraocular movements intact  Conjunctiva/sclera: Conjunctivae normal       Pupils: Pupils are equal, round, and reactive to light  Cardiovascular:      Rate and Rhythm: Normal rate and regular rhythm  Pulses: Normal pulses  Heart sounds: Normal heart sounds     Pulmonary:      Effort: Pulmonary effort is normal       Breath sounds: Normal breath sounds  Neurological:      Mental Status: She is alert and oriented to person, place, and time  Sensory: No sensory deficit  Gait: Gait normal    Psychiatric:         Mood and Affect: Mood normal          Behavior: Behavior normal            Assessment/Plan:    No problem-specific Assessment & Plan notes found for this encounter  Diagnoses and all orders for this visit:    Vertigo  -     meclizine (ANTIVERT) 12 5 MG tablet; Take 1 tablet (12 5 mg total) by mouth every 8 (eight) hours as needed for dizziness  -     Ambulatory referral to Physical Therapy;  Future

## 2021-07-16 ENCOUNTER — TELEPHONE (OUTPATIENT)
Dept: FAMILY MEDICINE CLINIC | Facility: CLINIC | Age: 61
End: 2021-07-16

## 2021-07-16 NOTE — TELEPHONE ENCOUNTER
Patient called because she lost the piece of paper you gave her with the name of an oil for her cholesterol  She would like the name of that oil again

## 2021-07-26 ENCOUNTER — TELEPHONE (OUTPATIENT)
Dept: FAMILY MEDICINE CLINIC | Facility: CLINIC | Age: 61
End: 2021-07-26

## 2021-07-26 ENCOUNTER — TREATMENT (OUTPATIENT)
Dept: FAMILY MEDICINE CLINIC | Facility: CLINIC | Age: 61
End: 2021-07-26

## 2021-07-26 ENCOUNTER — OFFICE VISIT (OUTPATIENT)
Dept: FAMILY MEDICINE CLINIC | Facility: CLINIC | Age: 61
End: 2021-07-26
Payer: COMMERCIAL

## 2021-07-26 VITALS
HEIGHT: 63 IN | SYSTOLIC BLOOD PRESSURE: 118 MMHG | HEART RATE: 63 BPM | OXYGEN SATURATION: 98 % | BODY MASS INDEX: 25.23 KG/M2 | TEMPERATURE: 98.2 F | DIASTOLIC BLOOD PRESSURE: 70 MMHG | WEIGHT: 142.4 LBS

## 2021-07-26 DIAGNOSIS — Z71.85 IMMUNIZATION COUNSELING: Primary | ICD-10-CM

## 2021-07-26 DIAGNOSIS — F41.9 ANXIETY: ICD-10-CM

## 2021-07-26 PROCEDURE — 99213 OFFICE O/P EST LOW 20 MIN: CPT | Performed by: FAMILY MEDICINE

## 2021-07-26 PROCEDURE — 1036F TOBACCO NON-USER: CPT | Performed by: FAMILY MEDICINE

## 2021-07-26 PROCEDURE — 3008F BODY MASS INDEX DOCD: CPT | Performed by: FAMILY MEDICINE

## 2021-07-26 RX ORDER — LORAZEPAM 0.5 MG/1
0.5 TABLET ORAL EVERY 8 HOURS PRN
Qty: 30 TABLET | Refills: 1 | Status: SHIPPED | OUTPATIENT
Start: 2021-07-26 | End: 2021-09-24

## 2021-07-26 NOTE — TELEPHONE ENCOUNTER
[facility] [address]  Please advise

## 2021-07-26 NOTE — PROGRESS NOTES
Assessment/Plan:    No problem-specific Assessment & Plan notes found for this encounter  Diagnoses and all orders for this visit:    Immunization counseling  Comments:  recommended tdap vaccine          Subjective:      Patient ID: Cristóbal Avery is a 56 y o  female  Dysphagia; 2 months ; initially ass/w/ tenderness over bilat anterior cervical regions  Feels "something in there"   The following portions of the patient's history were reviewed and updated as appropriate: allergies, current medications, past family history, past medical history, past social history, past surgical history and problem list     Review of Systems   Constitutional: Negative for chills and fever  HENT: Negative for congestion, ear pain, rhinorrhea, sinus pain and sore throat  Eyes: Negative for visual disturbance  Respiratory: Negative for cough, shortness of breath and wheezing  Cardiovascular: Negative for chest pain  Gastrointestinal: Negative for constipation and diarrhea  Skin: Negative for rash  Objective:  Vitals:    07/26/21 1002   BP: 118/70   BP Location: Left arm   Patient Position: Sitting   Cuff Size: Standard   Pulse: 63   Temp: 98 2 °F (36 8 °C)   TempSrc: Temporal   SpO2: 98%   Weight: 64 6 kg (142 lb 6 4 oz)   Height: 5' 3" (1 6 m)      Physical Exam  Constitutional:       Appearance: She is well-developed  HENT:      Right Ear: External ear normal       Left Ear: External ear normal       Mouth/Throat:      Pharynx: Oropharynx is clear  Eyes:      Conjunctiva/sclera: Conjunctivae normal    Neck:      Thyroid: No thyromegaly  Cardiovascular:      Rate and Rhythm: Normal rate and regular rhythm  Heart sounds: Normal heart sounds  Pulmonary:      Effort: No respiratory distress  Breath sounds: Normal breath sounds  No wheezing or rales  Musculoskeletal:      Cervical back: Neck supple  No tenderness  Lymphadenopathy:      Cervical: No cervical adenopathy  The course of this 2 month complaint seems like it may have originally been some viral lymphadenitis which is now improving  Neither the patient or I feel any lumps at this time that are tender  Since she had a full scoping of that area by ENT a month ago I do not feel we need any additional testing at this point    I told her to get an extra 3-4 weeks for total improvement and if not doing well at that point perhaps an ultrasound of the soft tissue of the neck would be indicated today's exam is normal

## 2021-09-24 ENCOUNTER — CONSULT (OUTPATIENT)
Dept: FAMILY MEDICINE CLINIC | Facility: CLINIC | Age: 61
End: 2021-09-24
Payer: COMMERCIAL

## 2021-09-24 VITALS
RESPIRATION RATE: 18 BRPM | DIASTOLIC BLOOD PRESSURE: 78 MMHG | BODY MASS INDEX: 24.95 KG/M2 | OXYGEN SATURATION: 98 % | HEIGHT: 63 IN | TEMPERATURE: 98 F | HEART RATE: 66 BPM | SYSTOLIC BLOOD PRESSURE: 124 MMHG | WEIGHT: 140.8 LBS

## 2021-09-24 DIAGNOSIS — E05.00 TOXIC GOITER: ICD-10-CM

## 2021-09-24 DIAGNOSIS — A04.8 H. PYLORI INFECTION: Chronic | ICD-10-CM

## 2021-09-24 DIAGNOSIS — R13.14 DYSPHAGIA, PHARYNGOESOPHAGEAL PHASE: ICD-10-CM

## 2021-09-24 DIAGNOSIS — G45.9 TIA (TRANSIENT ISCHEMIC ATTACK): Primary | ICD-10-CM

## 2021-09-24 PROCEDURE — 3008F BODY MASS INDEX DOCD: CPT | Performed by: FAMILY MEDICINE

## 2021-09-24 PROCEDURE — 1036F TOBACCO NON-USER: CPT | Performed by: FAMILY MEDICINE

## 2021-09-24 PROCEDURE — 99214 OFFICE O/P EST MOD 30 MIN: CPT | Performed by: FAMILY MEDICINE

## 2021-09-24 RX ORDER — EZETIMIBE 10 MG/1
1 TABLET ORAL DAILY
COMMUNITY
Start: 2021-09-22 | End: 2021-09-24

## 2021-09-24 RX ORDER — ATORVASTATIN CALCIUM 10 MG/1
1 TABLET, FILM COATED ORAL DAILY
COMMUNITY
Start: 2021-09-22

## 2021-09-24 NOTE — PROGRESS NOTES
[facility] PRE-OPERATIVE EVALUATION  [facility]YSICIAN [facility] - [facility]    NAME: Stephania Goodell  AGE: 56 y o  SEX: female  : 1960     DATE: 2021    [facility] Pre-Operative Evaluation      Chief Complaint: Pre-operative Evaluation     Surgery: Bilat cataract   Anticipated Date of Surgery:  + 10/11/21     History of Present Illness:     Patient has no prior history of bleeding issues or blood clots  Chronic conditions, medications and allergies were reviewed  There is no currently active infectious process  Assessment of Cardiac Risk:  · No unstable or severe angina or MI in the last 6 weeks or history of stent placement in the last year   · No decompensated heart failure (e g  New onset heart failure, NYHA functional class IV heart failure, or worsening existing heart failure) in past 3 mos  · No severe heart valve disease including aortic stenosis or symptomatic mitral stenosis     Exercise Capacity:  · Able to walk 4 blocks without symptoms  · Able to walk 2 flights without symptoms    NO Prior Anesthesia Reactions       No prolonged steroid use in past 6 mos  P A T  If done reviewed  Review of Systems:     Review of Systems   Constitutional: Negative for activity change and appetite change  HENT: Negative for voice change  Eyes: Positive for visual disturbance  Respiratory: Negative for chest tightness and shortness of breath  Cardiovascular: Negative for chest pain, palpitations and leg swelling  Gastrointestinal: Negative for abdominal pain, blood in stool, constipation and diarrhea  Genitourinary: Negative for dysuria, vaginal bleeding and vaginal discharge  Skin: Negative for rash  Neurological: Negative for dizziness  Psychiatric/Behavioral: Negative for dysphoric mood         Current Problem List:     Patient Active Problem List   Diagnosis    Myalgia    Lower abdominal pain    Age-related osteoporosis without current pathological fracture    H  pylori infection    Pubic bone pain    Hemorrhoids    Lattice degeneration of retina    Absence of bladder continence    Age-related nuclear cataract, bilateral    Allergic rhinitis    Anatomical narrow angle, bilateral    Angio-edema    Atrophic vaginitis    Chronic thoracic back pain    Closed fracture of distal end of radius    Dry eye syndrome of bilateral lacrimal glands    Dyspareunia    Dysphagia, pharyngoesophageal phase    Fibrocystic disease of liver    Horseshoe tear of retina without detachment, right eye    Hyperlipidemia    Mixed anxiety and depressive disorder    Neck pain    Palpitations    Pelvic pressure in female    Polyarthralgia    Allergic dermatitis    Syncope    Toxic goiter    Vitreous degeneration, bilateral    Sciatic leg pain    Near syncope    TIA (transient ischemic attack)    COVID-19    NSTEMI, initial episode of care [facility])       Allergies:      Allergies   Allergen Reactions    Iodine - Food Allergy Hives and Swelling     12/15/10    Other reaction(s): ANAPHYLACTIC SHOCK    Azithromycin Hives    Brimonidine Tartrate     Ciprofloxacin Dizziness    Nuts - Food Allergy Itching     Any kind of nuts    Shellfish Allergy - Food Allergy     Shellfish-Derived Products - Food Allergy Hives and Swelling    Brimonidine Itching and Palpitations    Metronidazole Palpitations       Current Medications:       Current Outpatient Medications:     brimonidine tartrate 0 2 % ophthalmic solution, INSTILL ONE DROP INTO BOTH EYES TWICE A DAY, Disp: , Rfl:     brimonidine-timolol (COMBIGAN) 0 2-0 5 %, 1 drop every 12 (twelve) hours, Disp: , Rfl:     latanoprost (XALATAN) 0 005 % ophthalmic solution, INSTILL 1 DROP INTO BOTH EYES AT BEDTIME, Disp: , Rfl:     atorvastatin (LIPITOR) 10 mg tablet, Take 1 tablet by mouth daily, Disp: , Rfl:     losartan (COZAAR) 25 mg tablet, , Disp: , Rfl:     Past Medical History:       Past Medical History:   Diagnosis Date    Abnormal Pap smear of cervix     Colon polyp     Glaucoma     Heart palpitations     has port to monitor; none since 2009    Hyperlipemia     Mixed anxiety and depressive disorder 9/16/2015    MVP (mitral valve prolapse) 1985        Past Surgical History:   Procedure Laterality Date    AUGMENTATION MAMMAPLASTY  2000    BREAST IMPLANT Bilateral     CARDIAC CATHETERIZATION  2009    normal    CARDIAC LOOP RECORDER      CHOLECYSTECTOMY  1995    COLONOSCOPY      HERNIA REPAIR      umbilical    KNEE DISLOCATION SURGERY Right     TOTAL ABDOMINAL HYSTERECTOMY  1999    with LSO; ? 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 -- patient unclear with history    TUBAL LIGATION      WRIST SURGERY Left     states there are screws but not metal   Can go through a metal detector        Family History   Problem Relation Age of Onset    Hypertension Mother     Breast cancer Mother 79    Osteoporosis Mother     Heart attack Father     Diabetes Sister     Diabetes Brother     Heart attack Paternal Grandfather     No Known Problems Maternal Grandmother     No Known Problems Maternal Grandfather     No Known Problems Paternal Grandmother     No Known Problems Maternal Aunt     No Known Problems Maternal Aunt     No Known Problems Paternal Aunt         Social History     Socioeconomic History    Marital status: /Civil Union     Spouse name: Not on file    Number of children: Not on file    Years of education: Not on file    Highest education level: Not on file   Occupational History    Not on file   Tobacco Use    Smoking status: Never Smoker    Smokeless tobacco: Never Used    Tobacco comment: no exposure to passive smoke   Vaping Use    Vaping Use: Never used   Substance and Sexual Activity    Alcohol use: Never    Drug use: Never    Sexual activity: Not Currently   Other Topics Concern    Not on file   Social History Narrative    Not on file Social Determinants of Health     Financial Resource Strain:     Difficulty of Paying Living Expenses:    Food Insecurity:     Worried About Running Out of Food in the Last Year:     [address] in the Last Year:    Transportation Needs:     Lack of Transportation (Medical):  Lack of Transportation (Non-Medical):    Physical Activity:     Days of Exercise per Week:     Minutes of Exercise per Session:    Stress:     Feeling of Stress :    Social Connections:     Frequency of Communication with Friends and Family:     Frequency of Social Gatherings with Friends and Family:     Attends [employer] Services:     Active Member of Clubs or Organizations:     Attends Club or Organization Meetings:     Marital Status:    Intimate Partner Violence:     Fear of Current or Ex-Partner:     Emotionally Abused:     Physically Abused:     Sexually Abused:         Physical Exam:     /78 (BP Location: Left arm, Patient Position: Sitting, Cuff Size: Standard)   Pulse 66   Temp 98 °F (36 7 °C) (Tympanic)   Resp 18   Ht 5' 2 5" (1 588 m)   Wt 63 9 kg (140 lb 12 8 oz)   SpO2 98%   BMI 25 34 kg/m²     Physical Exam  Constitutional:       Appearance: She is well-developed  HENT:      Head: Normocephalic and atraumatic  Eyes:      Conjunctiva/sclera: Conjunctivae normal    Neck:      Thyroid: No thyromegaly  Cardiovascular:      Rate and Rhythm: Normal rate and regular rhythm  Heart sounds: Normal heart sounds  No murmur heard  Pulmonary:      Effort: Pulmonary effort is normal  No respiratory distress  Breath sounds: Normal breath sounds  Musculoskeletal:      Cervical back: Neck supple  Lymphadenopathy:      Cervical: No cervical adenopathy  Skin:     General: Skin is warm and dry  Psychiatric:         Behavior: Behavior normal          Operative site has been examined and clear of skin infection and inflammation          Assessment & Recommendations:     Patient is cleared for surgery as detailed above       Surgical Procedure risk category: low    Patient specific operative risk categegory: low

## 2021-09-24 NOTE — PATIENT INSTRUCTIONS
Flu shot is best between the middle of October in the middle of November  Check the newspaper or radio for updates on when the COVID booster shot should be given  I do recommend getting it

## 2021-10-04 ENCOUNTER — TELEPHONE (OUTPATIENT)
Dept: FAMILY MEDICINE CLINIC | Facility: CLINIC | Age: 61
End: 2021-10-04

## 2023-01-13 NOTE — TELEPHONE ENCOUNTER
Deisy Ruvalcaba (:  1954) is a 77 y.o. female,Established patient, here for evaluation of the following chief complaint(s):  Urinary Tract Infection (Frequent urination, burning and foul odor)         ASSESSMENT/PLAN:  1. Urgency of urination  -     POCT Urinalysis no Micro  -     nitrofurantoin, macrocrystal-monohydrate, (MACROBID) 100 MG capsule; Take 1 capsule by mouth 2 times daily, Disp-14 capsule, R-0Normal  -     Culture, Urine  2. Burning with urination  -     POCT Urinalysis no Micro  -     nitrofurantoin, macrocrystal-monohydrate, (MACROBID) 100 MG capsule; Take 1 capsule by mouth 2 times daily, Disp-14 capsule, R-0Normal  -     Culture, Urine  3. Foul smelling urine  -     POCT Urinalysis no Micro  -     nitrofurantoin, macrocrystal-monohydrate, (MACROBID) 100 MG capsule; Take 1 capsule by mouth 2 times daily, Disp-14 capsule, R-0Normal  -     Culture, Urine  4. Dysuria  -     nitrofurantoin, macrocrystal-monohydrate, (MACROBID) 100 MG capsule; Take 1 capsule by mouth 2 times daily, Disp-14 capsule, R-0Normal  -     Culture, Urine  5. Severe obesity (BMI 35.0-39. 9) with comorbidity ([address]      1. Increase water intake  2. Avoid caffeine for 5-7 days  3. Return if symptoms reoccur of fail to improve    No follow-ups on file. Subjective   SUBJECTIVE/OBJECTIVE:  HPI    Yesterday noted frequency, not able to hold to get to the bathroom soon enough. Up to the BR during the night and noted discomfort with urination and foul odor. Review of Systems   All other systems reviewed and are negative. Objective   Physical Exam  Constitutional:       Appearance: Normal appearance. Cardiovascular:      Rate and Rhythm: Normal rate. Pulmonary:      Effort: Pulmonary effort is normal.   Abdominal:      General: Bowel sounds are normal. There is no distension. Palpations: Abdomen is soft. Comments: Neg CVA tenderness   Musculoskeletal:         General: Normal range of motion. Patient was offered to get her first covid vaccine on 3/23/21  Checked with cardiologist and was told to get it  She wanted the ok from you as well  Please advise  Neurological:      Mental Status: She is alert and oriented to person, place, and time.    Psychiatric:         Behavior: Behavior normal.                An electronic signature was used to authenticate this note.    --JULIET ELLIS - CNP